# Patient Record
Sex: MALE | Race: BLACK OR AFRICAN AMERICAN | NOT HISPANIC OR LATINO | Employment: UNEMPLOYED | URBAN - METROPOLITAN AREA
[De-identification: names, ages, dates, MRNs, and addresses within clinical notes are randomized per-mention and may not be internally consistent; named-entity substitution may affect disease eponyms.]

---

## 2017-09-04 ENCOUNTER — HOSPITAL ENCOUNTER (EMERGENCY)
Facility: HOSPITAL | Age: 37
Discharge: HOME | End: 2017-09-04
Attending: EMERGENCY MEDICINE | Admitting: NEUROLOGICAL SURGERY
Payer: COMMERCIAL

## 2017-09-04 VITALS
SYSTOLIC BLOOD PRESSURE: 134 MMHG | WEIGHT: 240 LBS | HEIGHT: 72 IN | RESPIRATION RATE: 18 BRPM | DIASTOLIC BLOOD PRESSURE: 74 MMHG | OXYGEN SATURATION: 99 % | HEART RATE: 73 BPM | TEMPERATURE: 99 F | BODY MASS INDEX: 32.51 KG/M2
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
## 2017-09-04 DIAGNOSIS — S12.600A C7 CERVICAL FRACTURE: ICD-10-CM

## 2017-09-04 DIAGNOSIS — S12.601A CLOSED NONDISPLACED FRACTURE OF SEVENTH CERVICAL VERTEBRA, UNSPECIFIED FRACTURE MORPHOLOGY, INITIAL ENCOUNTER: Primary | ICD-10-CM

## 2017-09-04 LAB
ABO GROUP BLD: NORMAL
ALBUMIN SERPL BCP-MCNC: 4.2 G/DL
ALP SERPL-CCNC: 53 U/L
ALT SERPL W/O P-5'-P-CCNC: 22 U/L
AMPHET+METHAMPHET UR QL: NEGATIVE
AMPHET+METHAMPHET UR QL: NEGATIVE
ANION GAP SERPL CALC-SCNC: 14 MMOL/L
APAP SERPL-MCNC: <3 UG/ML
AST SERPL-CCNC: 24 U/L
BARBITURATES UR QL SCN>200 NG/ML: NEGATIVE
BARBITURATES UR QL SCN>200 NG/ML: NEGATIVE
BASOPHILS # BLD AUTO: 0.01 K/UL
BASOPHILS NFR BLD: 0.1 %
BENZODIAZ UR QL SCN>200 NG/ML: NEGATIVE
BENZODIAZ UR QL SCN>200 NG/ML: NEGATIVE
BILIRUB SERPL-MCNC: 0.3 MG/DL
BILIRUB UR QL STRIP: NEGATIVE
BLD GP AB SCN CELLS X3 SERPL QL: NORMAL
BUN SERPL-MCNC: 10 MG/DL
BZE UR QL SCN: NEGATIVE
BZE UR QL SCN: NEGATIVE
CALCIUM SERPL-MCNC: 9.2 MG/DL
CANNABINOIDS UR QL SCN: NEGATIVE
CANNABINOIDS UR QL SCN: NEGATIVE
CHLORIDE SERPL-SCNC: 108 MMOL/L
CLARITY UR REFRACT.AUTO: CLEAR
CO2 SERPL-SCNC: 21 MMOL/L
COLOR UR AUTO: NORMAL
CREAT SERPL-MCNC: 1.1 MG/DL
CREAT UR-MCNC: 87 MG/DL
CREAT UR-MCNC: 87 MG/DL
DIFFERENTIAL METHOD: ABNORMAL
EOSINOPHIL # BLD AUTO: 0 K/UL
EOSINOPHIL NFR BLD: 0.1 %
ERYTHROCYTE [DISTWIDTH] IN BLOOD BY AUTOMATED COUNT: 14.6 %
EST. GFR  (AFRICAN AMERICAN): >60 ML/MIN/1.73 M^2
EST. GFR  (NON AFRICAN AMERICAN): >60 ML/MIN/1.73 M^2
ETHANOL SERPL-MCNC: 173 MG/DL
ETHANOL UR-MCNC: 236 MG/DL
GLUCOSE SERPL-MCNC: 130 MG/DL
GLUCOSE UR QL STRIP: NEGATIVE
HCT VFR BLD AUTO: 46.6 %
HGB BLD-MCNC: 16.5 G/DL
HGB UR QL STRIP: NEGATIVE
INR PPP: 1
KETONES UR QL STRIP: NEGATIVE
LEUKOCYTE ESTERASE UR QL STRIP: NEGATIVE
LYMPHOCYTES # BLD AUTO: 1.9 K/UL
LYMPHOCYTES NFR BLD: 27.3 %
MCH RBC QN AUTO: 30.9 PG
MCHC RBC AUTO-ENTMCNC: 35.4 G/DL
MCV RBC AUTO: 87 FL
METHADONE UR QL SCN>300 NG/ML: NEGATIVE
METHADONE UR QL SCN>300 NG/ML: NEGATIVE
MONOCYTES # BLD AUTO: 0.4 K/UL
MONOCYTES NFR BLD: 5.3 %
NEUTROPHILS # BLD AUTO: 4.7 K/UL
NEUTROPHILS NFR BLD: 67.2 %
NITRITE UR QL STRIP: NEGATIVE
OPIATES UR QL SCN: NEGATIVE
OPIATES UR QL SCN: NEGATIVE
PCP UR QL SCN>25 NG/ML: NEGATIVE
PCP UR QL SCN>25 NG/ML: NEGATIVE
PH UR STRIP: 5 [PH] (ref 5–8)
PLATELET # BLD AUTO: 205 K/UL
PMV BLD AUTO: 10.7 FL
POTASSIUM SERPL-SCNC: 4.2 MMOL/L
PROT SERPL-MCNC: 8.7 G/DL
PROT UR QL STRIP: NEGATIVE
PROTHROMBIN TIME: 10.1 SEC
RBC # BLD AUTO: 5.34 M/UL
RH BLD: NORMAL
SALICYLATES SERPL-MCNC: <5 MG/DL
SODIUM SERPL-SCNC: 143 MMOL/L
SP GR UR STRIP: 1.01 (ref 1–1.03)
TOXICOLOGY INFORMATION: ABNORMAL
TOXICOLOGY INFORMATION: NORMAL
URN SPEC COLLECT METH UR: NORMAL
UROBILINOGEN UR STRIP-ACNC: NEGATIVE EU/DL
WBC # BLD AUTO: 7.04 K/UL

## 2017-09-04 PROCEDURE — 86901 BLOOD TYPING SEROLOGIC RH(D): CPT

## 2017-09-04 PROCEDURE — 96375 TX/PRO/DX INJ NEW DRUG ADDON: CPT

## 2017-09-04 PROCEDURE — 25000003 PHARM REV CODE 250: Performed by: EMERGENCY MEDICINE

## 2017-09-04 PROCEDURE — 99284 EMERGENCY DEPT VISIT MOD MDM: CPT | Mod: ,,, | Performed by: EMERGENCY MEDICINE

## 2017-09-04 PROCEDURE — 63600175 PHARM REV CODE 636 W HCPCS: Performed by: EMERGENCY MEDICINE

## 2017-09-04 PROCEDURE — 99285 EMERGENCY DEPT VISIT HI MDM: CPT | Mod: 25

## 2017-09-04 PROCEDURE — 96374 THER/PROPH/DIAG INJ IV PUSH: CPT

## 2017-09-04 PROCEDURE — 81003 URINALYSIS AUTO W/O SCOPE: CPT

## 2017-09-04 PROCEDURE — 80307 DRUG TEST PRSMV CHEM ANLYZR: CPT

## 2017-09-04 PROCEDURE — 86901 BLOOD TYPING SEROLOGIC RH(D): CPT | Mod: 91

## 2017-09-04 PROCEDURE — 99284 EMERGENCY DEPT VISIT MOD MDM: CPT | Mod: ,,, | Performed by: NEUROLOGICAL SURGERY

## 2017-09-04 PROCEDURE — 85025 COMPLETE CBC W/AUTO DIFF WBC: CPT

## 2017-09-04 PROCEDURE — 86900 BLOOD TYPING SEROLOGIC ABO: CPT

## 2017-09-04 PROCEDURE — 80320 DRUG SCREEN QUANTALCOHOLS: CPT

## 2017-09-04 PROCEDURE — 80053 COMPREHEN METABOLIC PANEL: CPT

## 2017-09-04 PROCEDURE — 63600175 PHARM REV CODE 636 W HCPCS

## 2017-09-04 PROCEDURE — 80329 ANALGESICS NON-OPIOID 1 OR 2: CPT

## 2017-09-04 PROCEDURE — 85610 PROTHROMBIN TIME: CPT

## 2017-09-04 PROCEDURE — 86900 BLOOD TYPING SEROLOGIC ABO: CPT | Mod: 91

## 2017-09-04 RX ORDER — METHOCARBAMOL 750 MG/1
1500 TABLET, FILM COATED ORAL 3 TIMES DAILY
Qty: 30 TABLET | Refills: 2 | Status: SHIPPED | OUTPATIENT
Start: 2017-09-04 | End: 2017-09-09

## 2017-09-04 RX ORDER — HYDROCODONE BITARTRATE AND ACETAMINOPHEN 7.5; 325 MG/1; MG/1
1 TABLET ORAL EVERY 6 HOURS PRN
Qty: 30 TABLET | Refills: 0 | Status: SHIPPED | OUTPATIENT
Start: 2017-09-04 | End: 2022-06-28

## 2017-09-04 RX ORDER — NALOXONE HCL 0.4 MG/ML
1 VIAL (ML) INJECTION
Status: DISCONTINUED | OUTPATIENT
Start: 2017-09-04 | End: 2017-09-04 | Stop reason: HOSPADM

## 2017-09-04 RX ORDER — LORAZEPAM 2 MG/ML
2 INJECTION INTRAMUSCULAR
Status: DISCONTINUED | OUTPATIENT
Start: 2017-09-04 | End: 2017-09-04 | Stop reason: HOSPADM

## 2017-09-04 RX ORDER — KETOROLAC TROMETHAMINE 30 MG/ML
30 INJECTION, SOLUTION INTRAMUSCULAR; INTRAVENOUS
Status: COMPLETED | OUTPATIENT
Start: 2017-09-04 | End: 2017-09-04

## 2017-09-04 RX ORDER — OXYCODONE AND ACETAMINOPHEN 10; 325 MG/1; MG/1
1 TABLET ORAL
Status: COMPLETED | OUTPATIENT
Start: 2017-09-04 | End: 2017-09-04

## 2017-09-04 RX ORDER — NALOXONE HYDROCHLORIDE 1 MG/ML
INJECTION INTRAMUSCULAR; INTRAVENOUS; SUBCUTANEOUS
Status: COMPLETED
Start: 2017-09-04 | End: 2017-09-04

## 2017-09-04 RX ADMIN — KETOROLAC TROMETHAMINE 30 MG: 30 INJECTION, SOLUTION INTRAMUSCULAR at 07:09

## 2017-09-04 RX ADMIN — OXYCODONE HYDROCHLORIDE AND ACETAMINOPHEN 1 TABLET: 10; 325 TABLET ORAL at 02:09

## 2017-09-04 RX ADMIN — NALOXONE HYDROCHLORIDE 1 MG: 1 INJECTION PARENTERAL at 07:09

## 2017-09-04 RX ADMIN — BACITRACIN, NEOMYCIN, POLYMYXIN B 1 EACH: 400; 3.5; 5 OINTMENT TOPICAL at 02:09

## 2017-09-04 RX ADMIN — LORAZEPAM 1 MG: 2 INJECTION INTRAMUSCULAR; INTRAVENOUS at 03:09

## 2017-09-04 NOTE — ED NOTES
Dr Baldev Clay made aware that patient appears sleepy and & lethargic. Will hold Lorazepam injection.
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
## 2017-09-04 NOTE — SUBJECTIVE & OBJECTIVE
(Not in a hospital admission)    Review of patient's allergies indicates:  No Known Allergies    History reviewed. No pertinent past medical history.  History reviewed. No pertinent surgical history.  Family History     None        Social History Main Topics    Smoking status: Current Every Day Smoker    Smokeless tobacco: Never Used    Alcohol use Yes    Drug use: No    Sexual activity: Not on file     Review of Systems   Constitutional: Negative for fever.   Respiratory: Negative for shortness of breath.    Cardiovascular: Negative for chest pain.   Gastrointestinal: Negative for abdominal pain.   Musculoskeletal: Positive for neck pain.   Neurological: Negative for weakness and numbness.   Psychiatric/Behavioral: Positive for agitation.     Objective:     Weight: 108.9 kg (240 lb)  Body mass index is 32.55 kg/m².  Vital Signs (Most Recent):  Temp: 98.6 °F (37 °C) (09/04/17 0438)  Pulse: 104 (09/04/17 0601)  Resp: 19 (09/04/17 0514)  BP: (!) 141/83 (09/04/17 0601)  SpO2: 95 % (09/04/17 0606) Vital Signs (24h Range):  Temp:  [98.6 °F (37 °C)] 98.6 °F (37 °C)  Pulse:  [] 104  Resp:  [18-20] 19  SpO2:  [95 %-98 %] 95 %  BP: (120-160)/() 141/83       Physical Exam:    Constitutional: He appears well-developed and well-nourished.     Eyes: Pupils are equal, round, and reactive to light. EOM are normal.     Abdominal: Soft.     Psych/Behavior: He is alert. He is oriented to person, place, and time.     Musculoskeletal:        Right Upper Extremities: Muscle strength is 5/5.        Left Upper Extremities: Muscle strength is 5/5.       Right Lower Extremities: Muscle strength is 5/5.        Left Lower Extremities: Muscle strength is 5/5.     Neurological:        Sensory: There is no sensory deficit in the trunk. There is no sensory deficit in the extremities.        DTRs: DTRs are DTRS NORMAL AND SYMMETRICnormal and symmetric.        Cranial nerves: Cranial nerve(s) II, III, IV, V, VI, VII, VIII, IX, X,  XI and XII are intact.            Significant Labs:    Recent Labs  Lab 09/04/17 0315   *      K 4.2      CO2 21*   BUN 10   CREATININE 1.1   CALCIUM 9.2       Recent Labs  Lab 09/04/17 0315   WBC 7.04   HGB 16.5   HCT 46.6          Recent Labs  Lab 09/04/17 0315   INR 1.0     Microbiology Results (last 7 days)     ** No results found for the last 168 hours. **        Significant Diagnostics:  CT: Ct Head Without Contrast    Result Date: 9/4/2017  No acute intracranial abnormality. Specifically, no intracranial hemorrhage. Electronically signed by: ALYSSA HERRING MD Date:     09/04/17 Time:    01:51
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
## 2017-09-04 NOTE — ED NOTES
Pt transported back to ER from MRI with nurse with telemetry. MRI cannot be done at this time because of CXR needed
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
## 2017-09-04 NOTE — ED NOTES
Pt continuously tries to turn on side in bed after being asked to lay straight. Will continue to monitor.

## 2017-09-04 NOTE — ED NOTES
CXR is complete. Per dr Baldev Clay pt ok to go to MRI. MRI called and states ok to bring pt back & will get their radiologist to clear pt
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
## 2017-09-04 NOTE — DISCHARGE INSTRUCTIONS
Keep the cervical collar on at all times  Take the muscle relaxants as prescribed  Take the pain medication as needed  Follow up with the Neurosurgery clinic; the clinic should call to make arrangements

## 2020-04-06 NOTE — SUBJECTIVE & OBJECTIVE
(Not in a hospital admission)    Review of patient's allergies indicates:  No Known Allergies    History reviewed. No pertinent past medical history.  History reviewed. No pertinent surgical history.  Family History     None        Social History Main Topics    Smoking status: Current Every Day Smoker    Smokeless tobacco: Never Used    Alcohol use Yes    Drug use: No    Sexual activity: Not on file     Review of Systems: 14-point review of systems completed and otherwise negative except as noted in HPI.     Objective:     Weight: 108.9 kg (240 lb)  Body mass index is 32.55 kg/m².  Vital Signs (Most Recent):  Temp: 98.6 °F (37 °C) (09/04/17 0438)  Pulse: 87 (09/04/17 1032)  Resp: 17 (09/04/17 1032)  BP: 125/76 (09/04/17 1032)  SpO2: 98 % (09/04/17 1032) Vital Signs (24h Range):  Temp:  [98.6 °F (37 °C)] 98.6 °F (37 °C)  Pulse:  [] 87  Resp:  [17-20] 17  SpO2:  [95 %-98 %] 98 %  BP: (120-160)/() 125/76              Neurosurgery Physical Exam  Vital signs: reviewed above.   Constitutional: well-developed, no apparent distress  Abdomen soft, non-tender  Respiratory: non distressed   Neurological  Alert, Oriented to person, place, time  Speech/Language: intact  Head: normocephalic, atraumatic   PERRL, EOMI,   Facial expression symmetric  Spontaneously moves all extremities  Follows commands x4     RUE: 5/5 in all major muscle groups    Left deltoid 3/5    Left bicep 3/5    Left tricep 3/5    Left hand intrinsics 5/5    BLE: 5/5  Tone: no spasticity, no rigidity, no clonus, no atrophy  Pronator Drift: neg   Babinski: absent   Sensation to Light touch: intact      Significant Labs:    Recent Labs  Lab 09/04/17 0315   *      K 4.2      CO2 21*   BUN 10   CREATININE 1.1   CALCIUM 9.2       Recent Labs  Lab 09/04/17 0315   WBC 7.04   HGB 16.5   HCT 46.6          Recent Labs  Lab 09/04/17 0315   INR 1.0     Microbiology Results (last 7 days)     ** No results found for the last  I don't mind speaking before or after the visit. If she prefers, we could try to have a relatively short video portion of the visit, and I could call her immediately after on a different number for the remainder of the scheduled visit time.   168 hours. **        All pertinent labs from the last 24 hours have been reviewed.    Significant Diagnostics:  CT: Ct Head Without Contrast    Result Date: 9/4/2017   Stable exam. No evidence of recent hemorrhage or other acute traumatic injury. ______________________________________ Electronically signed by resident: ENID RAMIREZ MD Date:     09/04/17 Time:    10:25 As the supervising and teaching physician, I personally reviewed the images and resident's interpretation and I agree with the findings. Electronically signed by: MILTON MORENO MD Date:     09/04/17 Time:    10:29     Ct Head Without Contrast    Result Date: 9/4/2017  No acute intracranial abnormality. Specifically, no intracranial hemorrhage. Electronically signed by: ALYSSA HERRING MD Date:     09/04/17 Time:    01:51

## 2022-06-28 ENCOUNTER — HOSPITAL ENCOUNTER (INPATIENT)
Facility: HOSPITAL | Age: 42
LOS: 2 days | Discharge: HOME | DRG: 682 | End: 2022-06-30
Attending: EMERGENCY MEDICINE | Admitting: STUDENT IN AN ORGANIZED HEALTH CARE EDUCATION/TRAINING PROGRAM

## 2022-06-28 DIAGNOSIS — R11.10 VOMITING: ICD-10-CM

## 2022-06-28 DIAGNOSIS — K92.2 ACUTE GI BLEEDING: Primary | ICD-10-CM

## 2022-06-28 DIAGNOSIS — K92.2 GASTROINTESTINAL HEMORRHAGE, UNSPECIFIED GASTROINTESTINAL HEMORRHAGE TYPE: ICD-10-CM

## 2022-06-28 DIAGNOSIS — F14.10 COCAINE ABUSE: ICD-10-CM

## 2022-06-28 DIAGNOSIS — R79.89 ELEVATED TROPONIN: ICD-10-CM

## 2022-06-28 DIAGNOSIS — N17.9 ACUTE RENAL FAILURE: ICD-10-CM

## 2022-06-28 PROBLEM — Z87.19 HISTORY OF HEMATEMESIS: Status: ACTIVE | Noted: 2022-06-28

## 2022-06-28 PROBLEM — E87.5 HYPERKALEMIA: Status: ACTIVE | Noted: 2022-06-28

## 2022-06-28 PROBLEM — R09.02 HYPOXIA: Status: ACTIVE | Noted: 2022-06-28

## 2022-06-28 PROBLEM — Z72.0 TOBACCO USE: Status: ACTIVE | Noted: 2022-06-28

## 2022-06-28 LAB
ABO + RH BLD: NORMAL
ALBUMIN SERPL BCP-MCNC: 4.4 G/DL (ref 3.5–5.2)
ALP SERPL-CCNC: 53 U/L (ref 55–135)
ALT SERPL W/O P-5'-P-CCNC: 49 U/L (ref 10–44)
AMPHET+METHAMPHET UR QL: NEGATIVE
ANION GAP SERPL CALC-SCNC: 12 MMOL/L (ref 8–16)
ANION GAP SERPL CALC-SCNC: 16 MMOL/L (ref 8–16)
APTT BLDCRRT: 26.1 SEC (ref 21–32)
AST SERPL-CCNC: 78 U/L (ref 10–40)
BACTERIA #/AREA URNS HPF: ABNORMAL /HPF
BARBITURATES UR QL SCN>200 NG/ML: NEGATIVE
BASOPHILS # BLD AUTO: 0.04 K/UL (ref 0–0.2)
BASOPHILS NFR BLD: 0.3 % (ref 0–1.9)
BENZODIAZ UR QL SCN>200 NG/ML: NEGATIVE
BILIRUB SERPL-MCNC: 0.4 MG/DL (ref 0.1–1)
BILIRUB UR QL STRIP: NEGATIVE
BLD GP AB SCN CELLS X3 SERPL QL: NORMAL
BNP SERPL-MCNC: 76 PG/ML (ref 0–99)
BUN SERPL-MCNC: 22 MG/DL (ref 6–20)
BUN SERPL-MCNC: 24 MG/DL (ref 6–20)
BZE UR QL SCN: NEGATIVE
CALCIUM SERPL-MCNC: 8.4 MG/DL (ref 8.7–10.5)
CALCIUM SERPL-MCNC: 9.1 MG/DL (ref 8.7–10.5)
CANNABINOIDS UR QL SCN: NEGATIVE
CHLORIDE SERPL-SCNC: 102 MMOL/L (ref 95–110)
CHLORIDE SERPL-SCNC: 105 MMOL/L (ref 95–110)
CLARITY UR: ABNORMAL
CO2 SERPL-SCNC: 20 MMOL/L (ref 23–29)
CO2 SERPL-SCNC: 23 MMOL/L (ref 23–29)
COLOR UR: YELLOW
CREAT SERPL-MCNC: 2.9 MG/DL (ref 0.5–1.4)
CREAT SERPL-MCNC: 3.9 MG/DL (ref 0.5–1.4)
CREAT UR-MCNC: 349.2 MG/DL (ref 23–375)
CREAT UR-MCNC: 349.2 MG/DL (ref 23–375)
CTP QC/QA: YES
CTP QC/QA: YES
DIFFERENTIAL METHOD: ABNORMAL
EOSINOPHIL # BLD AUTO: 0 K/UL (ref 0–0.5)
EOSINOPHIL NFR BLD: 0 % (ref 0–8)
ERYTHROCYTE [DISTWIDTH] IN BLOOD BY AUTOMATED COUNT: 15.5 % (ref 11.5–14.5)
EST. GFR  (AFRICAN AMERICAN): 21 ML/MIN/1.73 M^2
EST. GFR  (AFRICAN AMERICAN): 30 ML/MIN/1.73 M^2
EST. GFR  (NON AFRICAN AMERICAN): 18 ML/MIN/1.73 M^2
EST. GFR  (NON AFRICAN AMERICAN): 26 ML/MIN/1.73 M^2
ETHANOL SERPL-MCNC: <10 MG/DL
GLUCOSE SERPL-MCNC: 110 MG/DL (ref 70–110)
GLUCOSE SERPL-MCNC: 132 MG/DL (ref 70–110)
GLUCOSE UR QL STRIP: ABNORMAL
GRAN CASTS #/AREA URNS LPF: 8 /LPF
HCT VFR BLD AUTO: 51.1 % (ref 40–54)
HGB BLD-MCNC: 16.8 G/DL (ref 14–18)
HGB UR QL STRIP: ABNORMAL
HYALINE CASTS #/AREA URNS LPF: >10 /LPF
IMM GRANULOCYTES # BLD AUTO: 0.09 K/UL (ref 0–0.04)
IMM GRANULOCYTES NFR BLD AUTO: 0.6 % (ref 0–0.5)
INR PPP: 1 (ref 0.8–1.2)
KETONES UR QL STRIP: ABNORMAL
LACTATE SERPL-SCNC: 2.3 MMOL/L (ref 0.5–2.2)
LACTATE SERPL-SCNC: 3.2 MMOL/L (ref 0.5–2.2)
LEUKOCYTE ESTERASE UR QL STRIP: ABNORMAL
LIPASE SERPL-CCNC: 33 U/L (ref 4–60)
LYMPHOCYTES # BLD AUTO: 1.5 K/UL (ref 1–4.8)
LYMPHOCYTES NFR BLD: 10.2 % (ref 18–48)
MCH RBC QN AUTO: 30.3 PG (ref 27–31)
MCHC RBC AUTO-ENTMCNC: 32.9 G/DL (ref 32–36)
MCV RBC AUTO: 92 FL (ref 82–98)
METHADONE UR QL SCN>300 NG/ML: NEGATIVE
MICROSCOPIC COMMENT: ABNORMAL
MONOCYTES # BLD AUTO: 1.1 K/UL (ref 0.3–1)
MONOCYTES NFR BLD: 7.4 % (ref 4–15)
NEUTROPHILS # BLD AUTO: 12 K/UL (ref 1.8–7.7)
NEUTROPHILS NFR BLD: 81.5 % (ref 38–73)
NITRITE UR QL STRIP: NEGATIVE
NRBC BLD-RTO: 0 /100 WBC
OPIATES UR QL SCN: NEGATIVE
PCP UR QL SCN>25 NG/ML: NEGATIVE
PH UR STRIP: 6 [PH] (ref 5–8)
PLATELET # BLD AUTO: 221 K/UL (ref 150–450)
PMV BLD AUTO: 11.1 FL (ref 9.2–12.9)
POC MOLECULAR INFLUENZA A AGN: NEGATIVE
POC MOLECULAR INFLUENZA B AGN: NEGATIVE
POCT GLUCOSE: 145 MG/DL (ref 70–110)
POCT GLUCOSE: 147 MG/DL (ref 70–110)
POTASSIUM SERPL-SCNC: 5.8 MMOL/L (ref 3.5–5.1)
POTASSIUM SERPL-SCNC: 7.3 MMOL/L (ref 3.5–5.1)
PROT SERPL-MCNC: 9 G/DL (ref 6–8.4)
PROT UR QL STRIP: ABNORMAL
PROTHROMBIN TIME: 10.3 SEC (ref 9–12.5)
RBC # BLD AUTO: 5.54 M/UL (ref 4.6–6.2)
RBC #/AREA URNS HPF: 15 /HPF (ref 0–4)
SARS-COV-2 RDRP RESP QL NAA+PROBE: NEGATIVE
SODIUM SERPL-SCNC: 138 MMOL/L (ref 136–145)
SODIUM SERPL-SCNC: 140 MMOL/L (ref 136–145)
SODIUM UR-SCNC: 54 MMOL/L (ref 20–250)
SP GR UR STRIP: 1.02 (ref 1–1.03)
SQUAMOUS #/AREA URNS HPF: 2 /HPF
TOXICOLOGY INFORMATION: NORMAL
TROPONIN I SERPL DL<=0.01 NG/ML-MCNC: 0.52 NG/ML (ref 0–0.03)
TROPONIN I SERPL DL<=0.01 NG/ML-MCNC: 0.54 NG/ML (ref 0–0.03)
TROPONIN I SERPL DL<=0.01 NG/ML-MCNC: 0.61 NG/ML (ref 0–0.03)
URN SPEC COLLECT METH UR: ABNORMAL
UROBILINOGEN UR STRIP-ACNC: NEGATIVE EU/DL
WBC # BLD AUTO: 14.74 K/UL (ref 3.9–12.7)
WBC #/AREA URNS HPF: 17 /HPF (ref 0–5)
WBC CLUMPS URNS QL MICRO: ABNORMAL

## 2022-06-28 PROCEDURE — 93005 ELECTROCARDIOGRAM TRACING: CPT

## 2022-06-28 PROCEDURE — 82077 ASSAY SPEC XCP UR&BREATH IA: CPT

## 2022-06-28 PROCEDURE — 80053 COMPREHEN METABOLIC PANEL: CPT

## 2022-06-28 PROCEDURE — 93010 EKG 12-LEAD: ICD-10-PCS | Mod: ,,, | Performed by: INTERNAL MEDICINE

## 2022-06-28 PROCEDURE — 25000003 PHARM REV CODE 250: Performed by: EMERGENCY MEDICINE

## 2022-06-28 PROCEDURE — 85610 PROTHROMBIN TIME: CPT

## 2022-06-28 PROCEDURE — 85730 THROMBOPLASTIN TIME PARTIAL: CPT

## 2022-06-28 PROCEDURE — 25000003 PHARM REV CODE 250

## 2022-06-28 PROCEDURE — 99291 CRITICAL CARE FIRST HOUR: CPT | Mod: ,,, | Performed by: INTERNAL MEDICINE

## 2022-06-28 PROCEDURE — 63600175 PHARM REV CODE 636 W HCPCS: Performed by: EMERGENCY MEDICINE

## 2022-06-28 PROCEDURE — 83880 ASSAY OF NATRIURETIC PEPTIDE: CPT

## 2022-06-28 PROCEDURE — 20000000 HC ICU ROOM

## 2022-06-28 PROCEDURE — 85025 COMPLETE CBC W/AUTO DIFF WBC: CPT

## 2022-06-28 PROCEDURE — 80048 BASIC METABOLIC PNL TOTAL CA: CPT | Mod: XB | Performed by: STUDENT IN AN ORGANIZED HEALTH CARE EDUCATION/TRAINING PROGRAM

## 2022-06-28 PROCEDURE — 83605 ASSAY OF LACTIC ACID: CPT | Mod: 91

## 2022-06-28 PROCEDURE — 83690 ASSAY OF LIPASE: CPT

## 2022-06-28 PROCEDURE — 84300 ASSAY OF URINE SODIUM: CPT | Performed by: STUDENT IN AN ORGANIZED HEALTH CARE EDUCATION/TRAINING PROGRAM

## 2022-06-28 PROCEDURE — 94760 N-INVAS EAR/PLS OXIMETRY 1: CPT

## 2022-06-28 PROCEDURE — 36415 COLL VENOUS BLD VENIPUNCTURE: CPT | Performed by: STUDENT IN AN ORGANIZED HEALTH CARE EDUCATION/TRAINING PROGRAM

## 2022-06-28 PROCEDURE — 63600175 PHARM REV CODE 636 W HCPCS

## 2022-06-28 PROCEDURE — C9113 INJ PANTOPRAZOLE SODIUM, VIA: HCPCS | Performed by: STUDENT IN AN ORGANIZED HEALTH CARE EDUCATION/TRAINING PROGRAM

## 2022-06-28 PROCEDURE — U0002 COVID-19 LAB TEST NON-CDC: HCPCS

## 2022-06-28 PROCEDURE — 87502 INFLUENZA DNA AMP PROBE: CPT

## 2022-06-28 PROCEDURE — 99291 PR CRITICAL CARE, E/M 30-74 MINUTES: ICD-10-PCS | Mod: ,,, | Performed by: INTERNAL MEDICINE

## 2022-06-28 PROCEDURE — 25000242 PHARM REV CODE 250 ALT 637 W/ HCPCS

## 2022-06-28 PROCEDURE — 63600175 PHARM REV CODE 636 W HCPCS: Mod: JA | Performed by: EMERGENCY MEDICINE

## 2022-06-28 PROCEDURE — 63600175 PHARM REV CODE 636 W HCPCS: Performed by: STUDENT IN AN ORGANIZED HEALTH CARE EDUCATION/TRAINING PROGRAM

## 2022-06-28 PROCEDURE — 94640 AIRWAY INHALATION TREATMENT: CPT

## 2022-06-28 PROCEDURE — C9113 INJ PANTOPRAZOLE SODIUM, VIA: HCPCS | Performed by: EMERGENCY MEDICINE

## 2022-06-28 PROCEDURE — 81000 URINALYSIS NONAUTO W/SCOPE: CPT | Mod: 59

## 2022-06-28 PROCEDURE — 84484 ASSAY OF TROPONIN QUANT: CPT | Mod: 91 | Performed by: STUDENT IN AN ORGANIZED HEALTH CARE EDUCATION/TRAINING PROGRAM

## 2022-06-28 PROCEDURE — 93010 ELECTROCARDIOGRAM REPORT: CPT | Mod: ,,, | Performed by: INTERNAL MEDICINE

## 2022-06-28 PROCEDURE — 86900 BLOOD TYPING SEROLOGIC ABO: CPT

## 2022-06-28 PROCEDURE — 84484 ASSAY OF TROPONIN QUANT: CPT | Mod: 91

## 2022-06-28 PROCEDURE — 86901 BLOOD TYPING SEROLOGIC RH(D): CPT

## 2022-06-28 PROCEDURE — 96360 HYDRATION IV INFUSION INIT: CPT

## 2022-06-28 PROCEDURE — 99285 EMERGENCY DEPT VISIT HI MDM: CPT | Mod: 25

## 2022-06-28 PROCEDURE — 87086 URINE CULTURE/COLONY COUNT: CPT

## 2022-06-28 PROCEDURE — 80307 DRUG TEST PRSMV CHEM ANLYZR: CPT

## 2022-06-28 PROCEDURE — 82962 GLUCOSE BLOOD TEST: CPT

## 2022-06-28 RX ORDER — CALCIUM GLUCONATE 20 MG/ML
1 INJECTION, SOLUTION INTRAVENOUS
Status: COMPLETED | OUTPATIENT
Start: 2022-06-28 | End: 2022-06-28

## 2022-06-28 RX ORDER — SODIUM CHLORIDE 0.9 % (FLUSH) 0.9 %
10 SYRINGE (ML) INJECTION
Status: DISCONTINUED | OUTPATIENT
Start: 2022-06-28 | End: 2022-06-30 | Stop reason: HOSPADM

## 2022-06-28 RX ORDER — PANTOPRAZOLE SODIUM 40 MG/10ML
80 INJECTION, POWDER, LYOPHILIZED, FOR SOLUTION INTRAVENOUS
Status: COMPLETED | OUTPATIENT
Start: 2022-06-28 | End: 2022-06-28

## 2022-06-28 RX ORDER — FAMOTIDINE 10 MG/ML
20 INJECTION INTRAVENOUS DAILY
Status: DISCONTINUED | OUTPATIENT
Start: 2022-06-29 | End: 2022-06-28

## 2022-06-28 RX ORDER — IPRATROPIUM BROMIDE AND ALBUTEROL SULFATE 2.5; .5 MG/3ML; MG/3ML
3 SOLUTION RESPIRATORY (INHALATION)
Status: COMPLETED | OUTPATIENT
Start: 2022-06-28 | End: 2022-06-28

## 2022-06-28 RX ORDER — HEPARIN SODIUM,PORCINE/D5W 25000/250
0-40 INTRAVENOUS SOLUTION INTRAVENOUS CONTINUOUS
Status: DISCONTINUED | OUTPATIENT
Start: 2022-06-28 | End: 2022-06-28

## 2022-06-28 RX ORDER — PANTOPRAZOLE SODIUM 40 MG/10ML
40 INJECTION, POWDER, LYOPHILIZED, FOR SOLUTION INTRAVENOUS 2 TIMES DAILY
Status: DISCONTINUED | OUTPATIENT
Start: 2022-06-28 | End: 2022-06-30

## 2022-06-28 RX ORDER — OCTREOTIDE ACETATE 50 UG/ML
50 INJECTION, SOLUTION INTRAVENOUS; SUBCUTANEOUS
Status: COMPLETED | OUTPATIENT
Start: 2022-06-28 | End: 2022-06-28

## 2022-06-28 RX ORDER — ALBUTEROL SULFATE 2.5 MG/.5ML
10 SOLUTION RESPIRATORY (INHALATION)
Status: COMPLETED | OUTPATIENT
Start: 2022-06-28 | End: 2022-06-28

## 2022-06-28 RX ORDER — MAG HYDROX/ALUMINUM HYD/SIMETH 200-200-20
30 SUSPENSION, ORAL (FINAL DOSE FORM) ORAL ONCE
Status: COMPLETED | OUTPATIENT
Start: 2022-06-28 | End: 2022-06-28

## 2022-06-28 RX ORDER — LIDOCAINE HYDROCHLORIDE 20 MG/ML
10 SOLUTION OROPHARYNGEAL ONCE
Status: COMPLETED | OUTPATIENT
Start: 2022-06-28 | End: 2022-06-28

## 2022-06-28 RX ORDER — PANTOPRAZOLE SODIUM 40 MG/10ML
80 INJECTION, POWDER, LYOPHILIZED, FOR SOLUTION INTRAVENOUS DAILY
Status: DISCONTINUED | OUTPATIENT
Start: 2022-06-28 | End: 2022-06-28

## 2022-06-28 RX ORDER — ONDANSETRON 2 MG/ML
4 INJECTION INTRAMUSCULAR; INTRAVENOUS EVERY 8 HOURS PRN
Status: DISCONTINUED | OUTPATIENT
Start: 2022-06-28 | End: 2022-06-29

## 2022-06-28 RX ADMIN — IPRATROPIUM BROMIDE AND ALBUTEROL SULFATE 3 ML: 2.5; .5 SOLUTION RESPIRATORY (INHALATION) at 01:06

## 2022-06-28 RX ADMIN — ALUMINUM HYDROXIDE, MAGNESIUM HYDROXIDE, AND SIMETHICONE 30 ML: 200; 200; 20 SUSPENSION ORAL at 02:06

## 2022-06-28 RX ADMIN — LIDOCAINE HYDROCHLORIDE 10 ML: 20 SOLUTION ORAL; TOPICAL at 02:06

## 2022-06-28 RX ADMIN — OCTREOTIDE ACETATE 50 MCG: 50 INJECTION, SOLUTION INTRAVENOUS; SUBCUTANEOUS at 04:06

## 2022-06-28 RX ADMIN — SODIUM CHLORIDE 1000 ML: 0.9 INJECTION, SOLUTION INTRAVENOUS at 03:06

## 2022-06-28 RX ADMIN — DEXTROSE MONOHYDRATE 25 G: 25 INJECTION, SOLUTION INTRAVENOUS at 03:06

## 2022-06-28 RX ADMIN — ALBUTEROL SULFATE 10 MG: 2.5 SOLUTION RESPIRATORY (INHALATION) at 02:06

## 2022-06-28 RX ADMIN — PANTOPRAZOLE SODIUM 80 MG: 40 INJECTION, POWDER, FOR SOLUTION INTRAVENOUS at 03:06

## 2022-06-28 RX ADMIN — HEPARIN SODIUM 12 UNITS/KG/HR: 5000 INJECTION INTRAVENOUS; SUBCUTANEOUS at 03:06

## 2022-06-28 RX ADMIN — SODIUM ZIRCONIUM CYCLOSILICATE 10 G: 10 POWDER, FOR SUSPENSION ORAL at 02:06

## 2022-06-28 RX ADMIN — OCTREOTIDE ACETATE 50 MCG/HR: 500 INJECTION, SOLUTION INTRAVENOUS; SUBCUTANEOUS at 05:06

## 2022-06-28 RX ADMIN — PANTOPRAZOLE SODIUM 40 MG: 40 INJECTION, POWDER, FOR SOLUTION INTRAVENOUS at 09:06

## 2022-06-28 RX ADMIN — SODIUM CHLORIDE 1000 ML: 0.9 INJECTION, SOLUTION INTRAVENOUS at 01:06

## 2022-06-28 RX ADMIN — CALCIUM GLUCONATE 1 G: 20 INJECTION, SOLUTION INTRAVENOUS at 03:06

## 2022-06-28 RX ADMIN — INSULIN HUMAN 5 UNITS: 100 INJECTION, SOLUTION PARENTERAL at 03:06

## 2022-06-28 NOTE — H&P
[facility] Medicine  History & Physical    Patient Name: Feliberto Crowley  MRN: [id number]  Patient Class: IP- Inpatient  Admission Date: 6/28/2022  Attending Physician: Mazin Crespo MD   Primary Care Provider: Primary Doctor No         Patient information was obtained from patient, EMS personnel, past medical records and ER records.     Subjective:     Principal Problem:Acute renal failure    Chief Complaint:   Chief Complaint   Patient presents with    Hematemesis     Pt reports vomiting coffee ground emesis since this morning accompanied by dizziness and weakness. Pt denies medical history or medication use.         HPI: Mr. Crowley is a 41 year old male with no reported past medical history who presents to the emergency department for evaluation vomiting dark brown substance.  Patient states he drink a few beers last night and went to sleep.  He said he woke feeling nauseous and vomited everywhere.  He was also feeling weak so he came to the emergency department for further evaluation.  He tells me he snorts cocaine occasionally but is unsure if he did this yesterday.  He denies any other drug use.  He does smoke cigarettes. He denies taking any blood thinners. He is unsure of what exactly happened. 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 Has not been out in the heat. Denies any renal issues. Patient denies chest pain, states he had some heart burn earlier. Maybe some abdominal pain/discomfort but none now. No fevers or chills.   In the ED, he was found to be hypoxic and vomited brown substance. He was placed on nasal cannula with improvement. He was also found to have Cr of 3.9, K of 7.3, troponin of 0.544 and Hb of 16. Alcohol level was <10. He was given insulin, bicarb, lokelma, and dextrose as well as albuterol treatment. He was given 2 liters NS. Cardiology was consulted and recommended heparin drip.      No past medical history on  file.    No past surgical history on file.    Review of patient's allergies indicates:  No Known Allergies    No current facility-administered medications on file prior to encounter.     Current Outpatient Medications on File Prior to Encounter   Medication Sig    hydrocodone-acetaminophen 7.5-325mg (NORCO) 7.5-325 mg per tablet Take 1 tablet by mouth every 6 (six) hours as needed for Pain.     Family History    None       Tobacco Use    Smoking status: Current Every Day Smoker    Smokeless tobacco: Never Used   Substance and Sexual Activity    Alcohol use: Yes    Drug use: No    Sexual activity: Not on file     Review of Systems   Constitutional:  Negative for chills and fever.   HENT:  Negative for congestion, hearing loss and mouth sores.    Eyes:  Negative for visual disturbance.   Cardiovascular:  Negative for chest pain and leg swelling.   Gastrointestinal:  Positive for nausea and vomiting. Negative for abdominal distention, abdominal pain, constipation and diarrhea.   Genitourinary:  Negative for decreased urine volume, difficulty urinating, dysuria and hematuria.   Musculoskeletal:  Negative for arthralgias and joint swelling.   Skin:  Negative for rash and wound.   Neurological:  Negative for dizziness, numbness and headaches.   Psychiatric/Behavioral:  Positive for decreased concentration. Negative for agitation and confusion.    Objective:     Vital Signs (Most Recent):  Temp: 99.2 °F (37.3 °C) (06/28/22 1219)  Pulse: 103 (06/28/22 1532)  Resp: 18 (06/28/22 1438)  BP: 137/81 (06/28/22 1532)  SpO2: (!) 90 % (06/28/22 1532)   Vital Signs (24h Range):  Temp:  [99.2 °F (37.3 °C)] 99.2 °F (37.3 °C)  Pulse:  [] 103  Resp:  [18-20] 18  SpO2:  [86 %-97 %] 90 %  BP: (119-148)/(72-89) 137/81     Weight: 108.9 kg (240 lb)  Body mass index is 29.21 kg/m².    Physical Exam  Vitals and nursing note reviewed.   Constitutional:       General: He is not in acute distress.     Appearance: Normal appearance. He  is ill-appearing.   HENT:      Head: Normocephalic.      Mouth/Throat:      Pharynx: Oropharynx is clear.   Eyes:      General: No scleral icterus.  Cardiovascular:      Rate and Rhythm: Regular rhythm. Tachycardia present.      Pulses: Normal pulses.      Heart sounds: Normal heart sounds. No murmur heard.  Pulmonary:      Effort: Pulmonary effort is normal.      Breath sounds: No wheezing.      Comments: Poor air movement  Abdominal:      General: Bowel sounds are normal. There is no distension.      Palpations: Abdomen is soft.      Tenderness: There is no abdominal tenderness.   Musculoskeletal:         General: No swelling. Normal range of motion.   Skin:     General: Skin is warm and dry.   Neurological:      Mental Status: He is alert and oriented to person, place, and time.      Motor: No weakness.   Psychiatric:         Attention and Perception: Attention normal.         Mood and Affect: Mood is anxious.         Speech: Speech normal.         Behavior: Behavior normal. Behavior is cooperative.         Thought Content: Thought content normal.           Significant Labs: All pertinent labs within the past 24 hours have been reviewed.  CBC:   Recent Labs   Lab 06/28/22  1319   WBC 14.74*   HGB 16.8   HCT 51.1        CMP:   Recent Labs   Lab 06/28/22  1319      K 7.3*      CO2 20*      BUN 22*   CREATININE 3.9*   CALCIUM 9.1   PROT 9.0*   ALBUMIN 4.4   BILITOT 0.4   ALKPHOS 53*   AST 78*   ALT 49*   ANIONGAP 16   EGFRNONAA 18*     Lactic Acid:   Recent Labs   Lab 06/28/22  1319   LACTATE 2.3*       Significant Imaging: I have reviewed all pertinent imaging results/findings within the past 24 hours.    Assessment/Plan:     * Acute renal failure  - Patient presented with acute renal failure with Cr of 3.9 and hyperkalemia  - patient denies any hx of renal dysfunction, decreased urine output- he was given IVF and shifting medications  - check urine lytes and urinalysis  - repeat BMP after  "IVF            Hypoxia  - Noted to be hypoxic and placed on 10 liters - able to be weaned down   - in no distress  - CXR reviewed, poor inspiration  - continue to monitor      History of hematemesis  Patient reported dark colored vomit with specks of blood. Nurse reports "Coffee ground"  - Hb stable, blood pressure stable  - on PPI  - GI consulted and started on octreotide      Elevated troponin  - Denies chest pain, troponin elevated at 0.544  - ECG reviewed, no elizabeth ST elevation  - Cardiology consulted - Discussed with Dr. Juarez - no to heparin drip  - check echo  - trend troponin  - suspect elevated in setting of hypoxia/acute renal failure/demand      Tobacco use  - noted      Hyperkalemia  - presented with K of 7.3 in setting of acute renal failure  - shifted in ED + given lokelma  - recheck now - if persistently elevated, will ask Nephrology to see        VTE Risk Mitigation (From admission, onward)         Ordered     IP VTE LOW RISK PATIENT  Once         06/28/22 1510                   Mazin Crespo MD  Department of Hospital Medicine   [facility] [place] - Emergency Dept  "

## 2022-06-28 NOTE — ASSESSMENT & PLAN NOTE
"Patient reported dark colored vomit with specks of blood. Nurse reports "Coffee ground"  - Hb stable, blood pressure stable  - on PPI  - GI consulted and started on octreotide    "

## 2022-06-28 NOTE — ASSESSMENT & PLAN NOTE
- Noted to be hypoxic and placed on 10 liters - able to be weaned down   - in no distress  - CXR reviewed, poor inspiration  - continue to monitor

## 2022-06-28 NOTE — ASSESSMENT & PLAN NOTE
- presented with K of 7.3 in setting of acute renal failure  - shifted in ED + given lokelma  - recheck now - if persistently elevated, will ask Nephrology to see

## 2022-06-28 NOTE — Clinical Note
The catheter was inserted into the and was removed from the ostium   left main. An angiography was performed of the left coronary arteries. Multiple views were taken.

## 2022-06-28 NOTE — PHARMACY MED REC
"Admission Medication History     The home medication history was taken by Makenna Chowdhury CPhT.    You may go to "Admission" then "Reconcile Home Medications" tabs to review and/or act upon these items.      The home medication list has been updated by the Pharmacy department.    Please read ALL comments highlighted in yellow.    Please address this information as you see fit.     Feel free to contact us if you have any questions or require assistance.      The medications listed below were removed from the home medication list. Please reorder if appropriate:  Patient reports no longer taking the following medication(s):   NORCO 7.5-325 mg tablet      Medications listed below were obtained from: Patient/family and Analytic software- [employer]  (Not in a hospital admission)        Makenna Chowdhury CPhT.  [phone]                    .          "

## 2022-06-28 NOTE — Clinical Note
The catheter was inserted into the, was removed from the and was inserted over the wire into the ostium   right coronary artery. An angiography was performed of the right coronary arteries. Multiple views were taken.

## 2022-06-28 NOTE — ED NOTES
Patient put on 10 L O2 due to O2 in the 80s.   Patient's O2 on 10 L 93-94%.   MD and RN notified.

## 2022-06-28 NOTE — NURSING
[facility] Intensive Care  ICU Shift Summary  Date: 6/28/2022      Prehospitalization: Home  Admit Date / LOS : 6/28/2022/ 0 days    Diagnosis: Acute renal failure    Consults:        Active: Cardio and GI       Needed: N/A     Code Status: Full Code   Advanced Directive: <no information>    LDA:  Lines/Drains/Airways     Peripheral Intravenous Line  Duration                Peripheral IV - Single Lumen 06/28/22 1315 20 G Posterior;Right Hand <1 day         Peripheral IV - Single Lumen 06/28/22 1600 20 G Right Antecubital <1 day              Central Lines/Site/Justification:Patient Does Not Have Central Line  Urinary Cath/Order/Justification:Patient Does Not Have Urinary Catheter    Vasopressors/Infusions:    octreotide (SANDOSTATIN) infusion 50 mcg/hr (06/28/22 1706)          GOALS: Volume/ Hemodynamic: N/A                     RASS: 0  alert and calm    Pain Management: none       Pain Controlled: not applicable     Rhythm: NSR and ST    Respiratory Device: Nasal Cannula                  Most Recent SBT/ SAT: N/A       MOVE Screen: PASS  ICU Liberation: not applicable    VTE Prophylaxis: Pharm  Mobility: Ambulatory  Stress Ulcer Prophylaxis: No    Isolation: No active isolations    Dietary:   Current Diet Order   Procedures    Diet NPO      Tolerance: not applicable  Advancement: no    I & O (24h):    Intake/Output Summary (Last 24 hours) at 6/28/2022 1818  Last data filed at 6/28/2022 1700  Gross per 24 hour   Intake 2093.22 ml   Output --   Net 2093.22 ml        Restraints: No    Significant Dates:  Post Op Date: N/A  Rescue Date: N/A  Imaging/ Diagnostics: N/A    Noteworthy Labs:  See labs    COVID Test: (--)  CBC/Anemia Labs: Coags:    Recent Labs   Lab 06/28/22  1319   WBC 14.74*   HGB 16.8   HCT 51.1      MCV 92   RDW 15.5*    Recent Labs   Lab 06/28/22  1319   INR 1.0   APTT 26.1        Chemistries:   Recent Labs   Lab 06/28/22  1319      K 7.3*      CO2 20*   BUN 22*   CREATININE 3.9*    CALCIUM 9.1   PROT 9.0*   BILITOT 0.4   ALKPHOS 53*   ALT 49*   AST 78*        Cardiac Enzymes: Ejection Fractions:    Recent Labs     06/28/22  1319 06/28/22  1624   TROPONINI 0.544* 0.611*    No results found for: EF     POCT Glucose: HbA1c:    Recent Labs   Lab 06/28/22  1510 06/28/22  1611   POCTGLUCOSE 147* 145*    No results found for: HGBA1C        ICU LOS 1h  Level of Care: OK to Transfer    Chart Check: 12 HR Done  Shift Summary/Plan for the shift: see care plan note

## 2022-06-28 NOTE — Clinical Note
60 ml of contrast were injected throughout the case. 40 mL of contrast was the total wasted during the case. 100 mL was the total amount used during the case.

## 2022-06-28 NOTE — ASSESSMENT & PLAN NOTE
- Denies chest pain, troponin elevated at 0.544  - ECG reviewed, no elizabeth ST elevation  - Cardiology consulted - Discussed with Dr. Juarez - no to heparin drip  - check echo  - trend troponin  - suspect elevated in setting of hypoxia/acute renal failure/demand

## 2022-06-28 NOTE — ED TRIAGE NOTES
Pt. States that he vomited what looks like coffee ground emesis this morning. Pt's O2 sats are also low. Pt. States that he feels weak.

## 2022-06-28 NOTE — PLAN OF CARE
Pt AAO x4. ST on the monitor in the low 100s, other VSS on 2 L NC, afebrile. No episodes of n/v since admission. No acute events.

## 2022-06-28 NOTE — Clinical Note
dry, intact, no bleeding and no hematoma. Right groin, 5 Japanese sheath removed from rcfa, gauze, tegaderm

## 2022-06-28 NOTE — HPI
Mr. Crowley is a 41 year old male with no reported past medical history who presents to the emergency department for evaluation vomiting dark brown substance.  Patient states he drink a few beers last night and went to sleep.  He said he woke feeling nauseous and vomited everywhere.  He was also feeling weak so he came to the emergency department for further evaluation.  He tells me he snorts cocaine occasionally but is unsure if he did this yesterday.  He denies any other drug use.  He does smoke cigarettes. He denies taking any blood thinners. He is unsure of what exactly happened. 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 Has not been out in the heat. Denies any renal issues. Patient denies chest pain, states he had some heart burn earlier. Maybe some abdominal pain/discomfort but none now. No fevers or chills.   In the ED, he was found to be hypoxic and vomited brown substance. He was placed on nasal cannula with improvement. He was also found to have Cr of 3.9, K of 7.3, troponin of 0.544 and Hb of 16. Alcohol level was <10. He was given insulin, bicarb, lokelma, and dextrose as well as albuterol treatment. He was given 2 liters NS. Cardiology was consulted and recommended heparin drip.

## 2022-06-28 NOTE — ASSESSMENT & PLAN NOTE
- Patient presented with acute renal failure with Cr of 3.9 and hyperkalemia  - patient denies any hx of renal dysfunction, decreased urine output- he was given IVF and shifting medications  - check urine lytes and urinalysis  - repeat BMP after IVF

## 2022-06-28 NOTE — ED PROVIDER NOTES
"Encounter Date: 6/28/2022       History     Chief Complaint   Patient presents with    Hematemesis     Pt reports vomiting coffee ground emesis since this morning accompanied by dizziness and weakness. Pt denies medical history or medication use.      40 y/o male with no PMHx presents to the ED for emergent evaluation of acute onset hematemesis that happened this morning. Patient states that he woke up, coughed, and had an episode of projectile vomiting, which he describes as "brown with red blood." Patient denies another episode of emesis. He reports drinking alcohol, smoking 1 pack of cigarettes daily, and using cocaine. Patient reports drinking yesterday night; however, he's unsure how much he drank. Patient is also unsure if he did any cocaine yesterday. Patient denies abdominal pain, N/V/D, chest pain, SOB, dysuria, hematuria, rectal bleeding, blood in stool, fever, or chills. No other symptoms reported.    The history is provided by the patient. No  was used.     Review of patient's allergies indicates:  No Known Allergies  No past medical history on file.  No past surgical history on file.  No family history on file.  Social History     Tobacco Use    Smoking status: Current Every Day Smoker    Smokeless tobacco: Never Used   Substance Use Topics    Alcohol use: Yes    Drug use: No     Review of Systems   Constitutional: Negative for chills and fever.   HENT: Negative for congestion, ear pain, rhinorrhea and sore throat.    Eyes: Negative for redness.   Respiratory: Negative for cough and shortness of breath.    Cardiovascular: Negative for chest pain.   Gastrointestinal: Positive for vomiting. Negative for abdominal pain, diarrhea and nausea.   Genitourinary: Negative for decreased urine volume, difficulty urinating, dysuria, frequency, hematuria and urgency.   Musculoskeletal: Negative for back pain and neck pain.   Skin: Negative for rash.   Neurological: Negative for headaches. "   Psychiatric/Behavioral: Negative for confusion.       Physical Exam     Initial Vitals [06/28/22 1219]   BP Pulse Resp Temp SpO2   119/72 (!) 118 20 99.2 °F (37.3 °C) (!) 86 %      MAP       --         Physical Exam    Nursing note and vitals reviewed.  Constitutional: He appears well-developed and well-nourished. He appears lethargic. He is active. No distress.   HENT:   Head: Normocephalic and atraumatic.   Mouth/Throat: Mucous membranes are normal.   Eyes: EOM are normal.   Neck: Trachea normal. Neck supple.   Cardiovascular: Normal rate and regular rhythm.   Pulmonary/Chest: No respiratory distress. He has wheezes in the right upper field and the left upper field. He has rhonchi in the right upper field and the left upper field.   Abdominal: Abdomen is soft. Bowel sounds are normal. He exhibits no distension. There is no abdominal tenderness.   Musculoskeletal:         General: No edema. Normal range of motion.      Cervical back: Neck supple.     Neurological: He appears lethargic.   Skin: Skin is warm, dry and intact.   Psychiatric: He has a normal mood and affect.         ED Course   Procedures  Labs Reviewed   CBC W/ AUTO DIFFERENTIAL - Abnormal; Notable for the following components:       Result Value    WBC 14.74 (*)     RDW 15.5 (*)     Immature Granulocytes 0.6 (*)     Gran # (ANC) 12.0 (*)     Immature Grans (Abs) 0.09 (*)     Mono # 1.1 (*)     Gran % 81.5 (*)     Lymph % 10.2 (*)     All other components within normal limits   COMPREHENSIVE METABOLIC PANEL - Abnormal; Notable for the following components:    Potassium 7.3 (*)     CO2 20 (*)     BUN 22 (*)     Creatinine 3.9 (*)     Total Protein 9.0 (*)     Alkaline Phosphatase 53 (*)     AST 78 (*)     ALT 49 (*)     eGFR if  21 (*)     eGFR if non  18 (*)     All other components within normal limits    Narrative:       POTASSIUM critical result(s) called and verbal readback obtained   from LUCY MADERA by VARINDER  06/28/2022 14:12   TROPONIN I - Abnormal; Notable for the following components:    Troponin I 0.544 (*)     All other components within normal limits   LACTIC ACID, PLASMA - Abnormal; Notable for the following components:    Lactate (Lactic Acid) 2.3 (*)     All other components within normal limits   ALCOHOL,MEDICAL (ETHANOL)   PROTIME-INR   APTT   B-TYPE NATRIURETIC PEPTIDE   LIPASE   DRUG SCREEN PANEL, URINE EMERGENCY   URINALYSIS, REFLEX TO URINE CULTURE   LACTIC ACID, PLASMA   APTT   PROTIME-INR   CBC W/ AUTO DIFFERENTIAL   BASIC METABOLIC PANEL   POCT INFLUENZA A/B MOLECULAR   SARS-COV-2 RDRP GENE   TYPE & SCREEN   POCT GLUCOSE MONITORING CONTINUOUS          Imaging Results          X-Ray Chest AP Portable (Final result)  Result time 06/28/22 13:09:47    Final result by Car Tiwari MD (06/28/22 13:09:47)                 Impression:      Poor depth of inspiration.  No acute chest disease identified.      Electronically signed by: Car Tiwari MD  Date:    06/28/2022  Time:    13:09             Narrative:    EXAMINATION:  XR CHEST AP PORTABLE    CLINICAL HISTORY:  Vomiting, unspecified    TECHNIQUE:  Single frontal view of the chest was performed.    COMPARISON:  09/04/2017.    FINDINGS:  There is a poor depth of inspiration.  The heart and mediastinal structures are unremarkable.  Pulmonary vasculature is within normal limits.  The lungs are free of focal consolidations.  There is no evidence for pneumothorax or large pleural effusions.  Bony structures are grossly intact.                                 Medications   pantoprazole injection 80 mg (has no administration in time range)   calcium gluconate 1 g in NS IVPB (premixed) (has no administration in time range)   dextrose 50% injection 25 g (has no administration in time range)   insulin regular injection 5 Units 0.05 mL (has no administration in time range)   sodium chloride 0.9% bolus 1,000 mL (has no administration in time range)   heparin 25,000  "units in dextrose 5% (100 units/ml) IV bolus from bag INITIAL BOLUS (max bolus 4000 units) (has no administration in time range)   heparin 25,000 units in dextrose 5% 250 mL (100 units/mL) infusion LOW INTENSITY nomogram - [facility] (has no administration in time range)   heparin 25,000 units in dextrose 5% (100 units/ml) IV bolus from bag - ADDITIONAL PRN BOLUS - 60 units/kg (max bolus 4000 units) (has no administration in time range)   heparin 25,000 units in dextrose 5% (100 units/ml) IV bolus from bag - ADDITIONAL PRN BOLUS - 30 units/kg (max bolus 4000 units) (has no administration in time range)   sodium chloride 0.9% bolus 1,000 mL (0 mLs Intravenous Stopped 6/28/22 1429)   albuterol-ipratropium 2.5 mg-0.5 mg/3 mL nebulizer solution 3 mL (3 mLs Nebulization Given 6/28/22 1326)   aluminum-magnesium hydroxide-simethicone 200-200-20 mg/5 mL suspension 30 mL (30 mLs Oral Given 6/28/22 1453)     And   LIDOcaine HCl 2% oral solution 10 mL (10 mLs Oral Given 6/28/22 1453)   albuterol sulfate nebulizer solution 10 mg (10 mg Nebulization Given 6/28/22 1438)   sodium zirconium cyclosilicate packet 10 g (10 g Oral Given 6/28/22 1456)     Medical Decision Making:   ED Management:  This is a 40 y/o male with no PMHx presents to the ED for emergent evaluation of acute onset hematemesis that happened this morning. Patient states that he woke up, coughed, and had an episode of projectile vomiting, which he describes as "brown with red blood." On PE, patient appears lethargic. Upon triage, he had an oxygen saturation of 86% on RA. He has saturation of 93% O2 on 10L oxygen. He has expiratory wheezes and rhonchi to RUF and LUF. His abdomen is soft and nontender. CXR revealed poor depth of inspiration. No acute disease identified. No evidence of PTX or large pleural effusions. Lungs are free of focal consolidations. Breathing treatment ordered. Patient endorsed epigastric tenderness on reevaluation. Ordered protonix and GI cocktail. "     COVID and flu negative. CBC revealed leukocytosis of 14.74. CMP revealed K of 7.3, BUN of 22, and Cr of 3.9. Ordered treatment for hyperkalemia here. PTT and PT-INR are unremarkable. BNP and lipase unremarkable. Lactic acid 2.3. Ethanol negative. Trop elevated at 0.544. Dr. Goss consulted Dr. Juarez, who advised starting the patient on heparin. Dr. Goss also consulted Dr. Crespo, who will accept the patient and resume further care of the patient in the ICU for hyperkalemia and acute renal failure.     Case discussed with and care coordinated in conjunction with my attending, Dr. Goss.                      Clinical Impression:   Final diagnoses:  [R11.10] Vomiting  [R11.10] Vomiting  [N17.9] Acute renal failure          ED Disposition Condition    Admit               Roxana Borja PA-C  06/28/22 1510       Roxana Borja PA-C  06/28/22 1511

## 2022-06-28 NOTE — SUBJECTIVE & OBJECTIVE
No past medical history on file.    No past surgical history on file.    Review of patient's allergies indicates:  No Known Allergies    No current facility-administered medications on file prior to encounter.     Current Outpatient Medications on File Prior to Encounter   Medication Sig    hydrocodone-acetaminophen 7.5-325mg (NORCO) 7.5-325 mg per tablet Take 1 tablet by mouth every 6 (six) hours as needed for Pain.     Family History    None       Tobacco Use    Smoking status: Current Every Day Smoker    Smokeless tobacco: Never Used   Substance and Sexual Activity    Alcohol use: Yes    Drug use: No    Sexual activity: Not on file     Review of Systems   Constitutional:  Negative for chills and fever.   HENT:  Negative for congestion, hearing loss and mouth sores.    Eyes:  Negative for visual disturbance.   Cardiovascular:  Negative for chest pain and leg swelling.   Gastrointestinal:  Positive for nausea and vomiting. Negative for abdominal distention, abdominal pain, constipation and diarrhea.   Genitourinary:  Negative for decreased urine volume, difficulty urinating, dysuria and hematuria.   Musculoskeletal:  Negative for arthralgias and joint swelling.   Skin:  Negative for rash and wound.   Neurological:  Negative for dizziness, numbness and headaches.   Psychiatric/Behavioral:  Positive for decreased concentration. Negative for agitation and confusion.    Objective:     Vital Signs (Most Recent):  Temp: 99.2 °F (37.3 °C) (06/28/22 1219)  Pulse: 103 (06/28/22 1532)  Resp: 18 (06/28/22 1438)  BP: 137/81 (06/28/22 1532)  SpO2: (!) 90 % (06/28/22 1532)   Vital Signs (24h Range):  Temp:  [99.2 °F (37.3 °C)] 99.2 °F (37.3 °C)  Pulse:  [] 103  Resp:  [18-20] 18  SpO2:  [86 %-97 %] 90 %  BP: (119-148)/(72-89) 137/81     Weight: 108.9 kg (240 lb)  Body mass index is 29.21 kg/m².    Physical Exam  Vitals and nursing note reviewed.   Constitutional:       General: He is not in acute distress.     Appearance:  Normal appearance. He is ill-appearing.   HENT:      Head: Normocephalic.      Mouth/Throat:      Pharynx: Oropharynx is clear.   Eyes:      General: No scleral icterus.  Cardiovascular:      Rate and Rhythm: Regular rhythm. Tachycardia present.      Pulses: Normal pulses.      Heart sounds: Normal heart sounds. No murmur heard.  Pulmonary:      Effort: Pulmonary effort is normal.      Breath sounds: No wheezing.      Comments: Poor air movement  Abdominal:      General: Bowel sounds are normal. There is no distension.      Palpations: Abdomen is soft.      Tenderness: There is no abdominal tenderness.   Musculoskeletal:         General: No swelling. Normal range of motion.   Skin:     General: Skin is warm and dry.   Neurological:      Mental Status: He is alert and oriented to person, place, and time.      Motor: No weakness.   Psychiatric:         Attention and Perception: Attention normal.         Mood and Affect: Mood is anxious.         Speech: Speech normal.         Behavior: Behavior normal. Behavior is cooperative.         Thought Content: Thought content normal.           Significant Labs: All pertinent labs within the past 24 hours have been reviewed.  CBC:   Recent Labs   Lab 06/28/22  1319   WBC 14.74*   HGB 16.8   HCT 51.1        CMP:   Recent Labs   Lab 06/28/22  1319      K 7.3*      CO2 20*      BUN 22*   CREATININE 3.9*   CALCIUM 9.1   PROT 9.0*   ALBUMIN 4.4   BILITOT 0.4   ALKPHOS 53*   AST 78*   ALT 49*   ANIONGAP 16   EGFRNONAA 18*     Lactic Acid:   Recent Labs   Lab 06/28/22  1319   LACTATE 2.3*       Significant Imaging: I have reviewed all pertinent imaging results/findings within the past 24 hours.

## 2022-06-29 ENCOUNTER — ANESTHESIA (OUTPATIENT)
Dept: ENDOSCOPY | Facility: HOSPITAL | Age: 42
DRG: 682 | End: 2022-06-29

## 2022-06-29 ENCOUNTER — ANESTHESIA EVENT (OUTPATIENT)
Dept: ENDOSCOPY | Facility: HOSPITAL | Age: 42
DRG: 682 | End: 2022-06-29

## 2022-06-29 PROBLEM — K20.80 ESOPHAGITIS, LOS ANGELES GRADE C: Status: ACTIVE | Noted: 2022-06-29

## 2022-06-29 PROBLEM — R09.02 HYPOXIA: Status: RESOLVED | Noted: 2022-06-28 | Resolved: 2022-06-29

## 2022-06-29 PROBLEM — K92.2 GI HEMORRHAGE: Status: ACTIVE | Noted: 2022-06-28

## 2022-06-29 PROBLEM — D62 ACUTE BLOOD LOSS ANEMIA: Status: ACTIVE | Noted: 2022-06-29

## 2022-06-29 LAB
ALBUMIN SERPL BCP-MCNC: 3.5 G/DL (ref 3.5–5.2)
ALP SERPL-CCNC: 41 U/L (ref 55–135)
ALT SERPL W/O P-5'-P-CCNC: 65 U/L (ref 10–44)
ANION GAP SERPL CALC-SCNC: 10 MMOL/L (ref 8–16)
ASCENDING AORTA: 2.75 CM
AST SERPL-CCNC: 130 U/L (ref 10–40)
AV INDEX (PROSTH): 0.83
AV MEAN GRADIENT: 4 MMHG
AV PEAK GRADIENT: 7 MMHG
AV VALVE AREA: 2.75 CM2
AV VELOCITY RATIO: 0.81
BASOPHILS # BLD AUTO: 0.02 K/UL (ref 0–0.2)
BASOPHILS NFR BLD: 0.2 % (ref 0–1.9)
BILIRUB SERPL-MCNC: 0.5 MG/DL (ref 0.1–1)
BSA FOR ECHO PROCEDURE: 2.29 M2
BUN SERPL-MCNC: 22 MG/DL (ref 6–20)
CALCIUM SERPL-MCNC: 8.7 MG/DL (ref 8.7–10.5)
CHLORIDE SERPL-SCNC: 103 MMOL/L (ref 95–110)
CO2 SERPL-SCNC: 26 MMOL/L (ref 23–29)
CREAT SERPL-MCNC: 1.4 MG/DL (ref 0.5–1.4)
CV ECHO LV RWT: 0.42 CM
DIFFERENTIAL METHOD: ABNORMAL
DOP CALC AO PEAK VEL: 1.35 M/S
DOP CALC AO VTI: 28.35 CM
DOP CALC LVOT AREA: 3.3 CM2
DOP CALC LVOT DIAMETER: 2.06 CM
DOP CALC LVOT PEAK VEL: 1.09 M/S
DOP CALC LVOT STROKE VOLUME: 77.95 CM3
DOP CALCLVOT PEAK VEL VTI: 23.4 CM
E WAVE DECELERATION TIME: 109.77 MSEC
E/A RATIO: 1.59
E/E' RATIO: 9.37 M/S
ECHO LV POSTERIOR WALL: 1.03 CM (ref 0.6–1.1)
EJECTION FRACTION: 55 %
EOSINOPHIL # BLD AUTO: 0 K/UL (ref 0–0.5)
EOSINOPHIL NFR BLD: 0 % (ref 0–8)
ERYTHROCYTE [DISTWIDTH] IN BLOOD BY AUTOMATED COUNT: 15.2 % (ref 11.5–14.5)
EST. GFR  (AFRICAN AMERICAN): >60 ML/MIN/1.73 M^2
EST. GFR  (NON AFRICAN AMERICAN): >60 ML/MIN/1.73 M^2
FRACTIONAL SHORTENING: 28 % (ref 28–44)
GLUCOSE SERPL-MCNC: 129 MG/DL (ref 70–110)
HCT VFR BLD AUTO: 41.3 % (ref 40–54)
HGB BLD-MCNC: 13.7 G/DL (ref 14–18)
IMM GRANULOCYTES # BLD AUTO: 0.03 K/UL (ref 0–0.04)
IMM GRANULOCYTES NFR BLD AUTO: 0.3 % (ref 0–0.5)
INTERVENTRICULAR SEPTUM: 1 CM (ref 0.6–1.1)
IVRT: 122.26 MSEC
LA MAJOR: 4.79 CM
LA MINOR: 4.66 CM
LA WIDTH: 4.37 CM
LEFT ATRIUM SIZE: 2.93 CM
LEFT ATRIUM VOLUME INDEX: 22.7 ML/M2
LEFT ATRIUM VOLUME: 51.41 CM3
LEFT INTERNAL DIMENSION IN SYSTOLE: 3.57 CM (ref 2.1–4)
LEFT VENTRICLE DIASTOLIC VOLUME INDEX: 51.3 ML/M2
LEFT VENTRICLE DIASTOLIC VOLUME: 115.94 ML
LEFT VENTRICLE MASS INDEX: 81 G/M2
LEFT VENTRICLE SYSTOLIC VOLUME INDEX: 23.6 ML/M2
LEFT VENTRICLE SYSTOLIC VOLUME: 53.24 ML
LEFT VENTRICULAR INTERNAL DIMENSION IN DIASTOLE: 4.96 CM (ref 3.5–6)
LEFT VENTRICULAR MASS: 183.23 G
LV LATERAL E/E' RATIO: 8.09 M/S
LV SEPTAL E/E' RATIO: 11.13 M/S
LYMPHOCYTES # BLD AUTO: 2.8 K/UL (ref 1–4.8)
LYMPHOCYTES NFR BLD: 23.6 % (ref 18–48)
MAGNESIUM SERPL-MCNC: 2.1 MG/DL (ref 1.6–2.6)
MCH RBC QN AUTO: 30.4 PG (ref 27–31)
MCHC RBC AUTO-ENTMCNC: 33.2 G/DL (ref 32–36)
MCV RBC AUTO: 92 FL (ref 82–98)
MONOCYTES # BLD AUTO: 1.2 K/UL (ref 0.3–1)
MONOCYTES NFR BLD: 10.3 % (ref 4–15)
MV PEAK A VEL: 0.56 M/S
MV PEAK E VEL: 0.89 M/S
MV STENOSIS PRESSURE HALF TIME: 31.83 MS
MV VALVE AREA P 1/2 METHOD: 6.91 CM2
NEUTROPHILS # BLD AUTO: 7.7 K/UL (ref 1.8–7.7)
NEUTROPHILS NFR BLD: 65.6 % (ref 38–73)
NRBC BLD-RTO: 0 /100 WBC
PHOSPHATE SERPL-MCNC: 3.6 MG/DL (ref 2.7–4.5)
PISA TR MAX VEL: 2.13 M/S
PLATELET # BLD AUTO: 156 K/UL (ref 150–450)
PMV BLD AUTO: 10.2 FL (ref 9.2–12.9)
POTASSIUM SERPL-SCNC: 4.5 MMOL/L (ref 3.5–5.1)
PROT SERPL-MCNC: 6.9 G/DL (ref 6–8.4)
PULM VEIN S/D RATIO: 1
PV PEAK D VEL: 0.43 M/S
PV PEAK S VEL: 0.43 M/S
PV PEAK VELOCITY: 0.93 CM/S
RA MAJOR: 4.65 CM
RA PRESSURE: 3 MMHG
RA WIDTH: 3.25 CM
RBC # BLD AUTO: 4.51 M/UL (ref 4.6–6.2)
RIGHT VENTRICULAR END-DIASTOLIC DIMENSION: 3.83 CM
RV TISSUE DOPPLER FREE WALL SYSTOLIC VELOCITY 1 (APICAL 4 CHAMBER VIEW): 12.01 CM/S
SINUS: 2.82 CM
SODIUM SERPL-SCNC: 139 MMOL/L (ref 136–145)
STJ: 2.55 CM
TDI LATERAL: 0.11 M/S
TDI SEPTAL: 0.08 M/S
TDI: 0.1 M/S
TR MAX PG: 18 MMHG
TRICUSPID ANNULAR PLANE SYSTOLIC EXCURSION: 1.84 CM
TROPONIN I SERPL DL<=0.01 NG/ML-MCNC: 0.48 NG/ML (ref 0–0.03)
TV REST PULMONARY ARTERY PRESSURE: 21 MMHG
WBC # BLD AUTO: 11.73 K/UL (ref 3.9–12.7)

## 2022-06-29 PROCEDURE — 80053 COMPREHEN METABOLIC PANEL: CPT | Performed by: STUDENT IN AN ORGANIZED HEALTH CARE EDUCATION/TRAINING PROGRAM

## 2022-06-29 PROCEDURE — D9220A PRA ANESTHESIA: Mod: ,,, | Performed by: ANESTHESIOLOGY

## 2022-06-29 PROCEDURE — 25000003 PHARM REV CODE 250: Performed by: EMERGENCY MEDICINE

## 2022-06-29 PROCEDURE — 99222 1ST HOSP IP/OBS MODERATE 55: CPT | Mod: 25,,, | Performed by: INTERNAL MEDICINE

## 2022-06-29 PROCEDURE — C9113 INJ PANTOPRAZOLE SODIUM, VIA: HCPCS | Performed by: STUDENT IN AN ORGANIZED HEALTH CARE EDUCATION/TRAINING PROGRAM

## 2022-06-29 PROCEDURE — 36415 COLL VENOUS BLD VENIPUNCTURE: CPT | Performed by: STUDENT IN AN ORGANIZED HEALTH CARE EDUCATION/TRAINING PROGRAM

## 2022-06-29 PROCEDURE — 43235 EGD DIAGNOSTIC BRUSH WASH: CPT | Mod: ,,, | Performed by: INTERNAL MEDICINE

## 2022-06-29 PROCEDURE — 63600175 PHARM REV CODE 636 W HCPCS: Performed by: NURSE ANESTHETIST, CERTIFIED REGISTERED

## 2022-06-29 PROCEDURE — 43235 PR EGD, FLEX, DIAGNOSTIC: ICD-10-PCS | Mod: ,,, | Performed by: INTERNAL MEDICINE

## 2022-06-29 PROCEDURE — 83735 ASSAY OF MAGNESIUM: CPT | Performed by: STUDENT IN AN ORGANIZED HEALTH CARE EDUCATION/TRAINING PROGRAM

## 2022-06-29 PROCEDURE — D9220A PRA ANESTHESIA: ICD-10-PCS | Mod: ,,, | Performed by: ANESTHESIOLOGY

## 2022-06-29 PROCEDURE — 85025 COMPLETE CBC W/AUTO DIFF WBC: CPT | Performed by: STUDENT IN AN ORGANIZED HEALTH CARE EDUCATION/TRAINING PROGRAM

## 2022-06-29 PROCEDURE — 25000003 PHARM REV CODE 250: Performed by: HOSPITALIST

## 2022-06-29 PROCEDURE — 37000008 HC ANESTHESIA 1ST 15 MINUTES: Performed by: INTERNAL MEDICINE

## 2022-06-29 PROCEDURE — 43235 EGD DIAGNOSTIC BRUSH WASH: CPT | Performed by: INTERNAL MEDICINE

## 2022-06-29 PROCEDURE — S5010 5% DEXTROSE AND 0.45% SALINE: HCPCS | Performed by: HOSPITALIST

## 2022-06-29 PROCEDURE — 25000003 PHARM REV CODE 250: Performed by: NURSE ANESTHETIST, CERTIFIED REGISTERED

## 2022-06-29 PROCEDURE — 20000000 HC ICU ROOM

## 2022-06-29 PROCEDURE — 99291 PR CRITICAL CARE, E/M 30-74 MINUTES: ICD-10-PCS | Mod: 25,,, | Performed by: INTERNAL MEDICINE

## 2022-06-29 PROCEDURE — 99222 PR INITIAL HOSPITAL CARE,LEVL II: ICD-10-PCS | Mod: 25,,, | Performed by: INTERNAL MEDICINE

## 2022-06-29 PROCEDURE — 37000009 HC ANESTHESIA EA ADD 15 MINS: Performed by: INTERNAL MEDICINE

## 2022-06-29 PROCEDURE — 63600175 PHARM REV CODE 636 W HCPCS: Mod: JA | Performed by: EMERGENCY MEDICINE

## 2022-06-29 PROCEDURE — 63600175 PHARM REV CODE 636 W HCPCS: Performed by: STUDENT IN AN ORGANIZED HEALTH CARE EDUCATION/TRAINING PROGRAM

## 2022-06-29 PROCEDURE — 84100 ASSAY OF PHOSPHORUS: CPT | Performed by: STUDENT IN AN ORGANIZED HEALTH CARE EDUCATION/TRAINING PROGRAM

## 2022-06-29 PROCEDURE — 99291 CRITICAL CARE FIRST HOUR: CPT | Mod: 25,,, | Performed by: INTERNAL MEDICINE

## 2022-06-29 RX ORDER — ONDANSETRON 2 MG/ML
8 INJECTION INTRAMUSCULAR; INTRAVENOUS EVERY 6 HOURS PRN
Status: DISCONTINUED | OUTPATIENT
Start: 2022-06-29 | End: 2022-06-30

## 2022-06-29 RX ORDER — DEXTROSE MONOHYDRATE AND SODIUM CHLORIDE 5; .45 G/100ML; G/100ML
INJECTION, SOLUTION INTRAVENOUS CONTINUOUS
Status: DISCONTINUED | OUTPATIENT
Start: 2022-06-29 | End: 2022-06-29

## 2022-06-29 RX ORDER — PROPOFOL 10 MG/ML
VIAL (ML) INTRAVENOUS
Status: DISCONTINUED | OUTPATIENT
Start: 2022-06-29 | End: 2022-06-29

## 2022-06-29 RX ORDER — LIDOCAINE HYDROCHLORIDE 20 MG/ML
INJECTION INTRAVENOUS
Status: DISCONTINUED | OUTPATIENT
Start: 2022-06-29 | End: 2022-06-29

## 2022-06-29 RX ORDER — LIDOCAINE HYDROCHLORIDE 20 MG/ML
INJECTION, SOLUTION EPIDURAL; INFILTRATION; INTRACAUDAL; PERINEURAL
Status: DISPENSED
Start: 2022-06-29 | End: 2022-06-30

## 2022-06-29 RX ORDER — ACETAMINOPHEN 325 MG/1
650 TABLET ORAL EVERY 4 HOURS PRN
Status: DISCONTINUED | OUTPATIENT
Start: 2022-06-29 | End: 2022-06-30 | Stop reason: HOSPADM

## 2022-06-29 RX ORDER — POLYETHYLENE GLYCOL 3350 17 G/17G
17 POWDER, FOR SOLUTION ORAL 2 TIMES DAILY PRN
Status: DISCONTINUED | OUTPATIENT
Start: 2022-06-29 | End: 2022-06-30 | Stop reason: HOSPADM

## 2022-06-29 RX ORDER — PROPOFOL 10 MG/ML
INJECTION, EMULSION INTRAVENOUS
Status: DISPENSED
Start: 2022-06-29 | End: 2022-06-30

## 2022-06-29 RX ADMIN — LIDOCAINE HYDROCHLORIDE 100 MG: 20 INJECTION, SOLUTION INTRAVENOUS at 12:06

## 2022-06-29 RX ADMIN — PANTOPRAZOLE SODIUM 40 MG: 40 INJECTION, POWDER, FOR SOLUTION INTRAVENOUS at 09:06

## 2022-06-29 RX ADMIN — PANTOPRAZOLE SODIUM 40 MG: 40 INJECTION, POWDER, FOR SOLUTION INTRAVENOUS at 08:06

## 2022-06-29 RX ADMIN — PROPOFOL 60 MG: 10 INJECTION, EMULSION INTRAVENOUS at 12:06

## 2022-06-29 RX ADMIN — OCTREOTIDE ACETATE 50 MCG/HR: 500 INJECTION, SOLUTION INTRAVENOUS; SUBCUTANEOUS at 01:06

## 2022-06-29 RX ADMIN — SODIUM CHLORIDE: 0.9 INJECTION, SOLUTION INTRAVENOUS at 12:06

## 2022-06-29 RX ADMIN — PROPOFOL 100 MG: 10 INJECTION, EMULSION INTRAVENOUS at 12:06

## 2022-06-29 RX ADMIN — DEXTROSE AND SODIUM CHLORIDE: 5; .45 INJECTION, SOLUTION INTRAVENOUS at 11:06

## 2022-06-29 RX ADMIN — OCTREOTIDE ACETATE 50 MCG/HR: 500 INJECTION, SOLUTION INTRAVENOUS; SUBCUTANEOUS at 11:06

## 2022-06-29 NOTE — HPI
Mr. Crowley is a 41 year old male with no reported past medical history who presents to the emergency department for evaluation vomiting dark brown substance.  Patient states he drink a few beers last night and went to sleep.  He said he woke feeling nauseous and vomited everywhere.  He was also feeling weak so he came to the emergency department for further evaluation.  He tells me he snorts cocaine occasionally but is unsure if he did this yesterday.  He denies any other drug use.  He does smoke cigarettes. He denies taking any blood thinners. He is unsure of what exactly happened. 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 Has not been out in the heat. Denies any renal issues. Patient denies chest pain, states he had some heart burn earlier. Maybe some abdominal pain/discomfort but none now. No fevers or chills.   In the ED, he was found to be hypoxic and vomited brown substance. He was placed on nasal cannula with improvement. He was also found to have Cr of 3.9, K of 7.3, troponin of 0.544 and Hb of 16. Alcohol level was <10. He was given insulin, bicarb, lokelma, and dextrose as well as albuterol treatment. He was given 2 liters NS.     Patient denies any anginal sounding chest pains.  Troponins mildly elevated.  Had an episode of hematemesis in the ER also and 1 episode at home.  States that he snorts cocaine about once a month.  EKG without evidence of ST elevation.

## 2022-06-29 NOTE — TRANSFER OF CARE
"Anesthesia Transfer of Care Note    Patient: Feliberto Crowley    Procedure(s) Performed: Procedure(s) (LRB):  EGD (ESOPHAGOGASTRODUODENOSCOPY) (N/A)    Patient location: ICU    Anesthesia Type: general    Transport from OR: Transported from OR on 2-3 L/min O2 by NC with adequate spontaneous ventilation. Continuous ECG monitoring in transport. Continuous SpO2 monitoring in transport    Post pain: adequate analgesia    Post assessment: no apparent anesthetic complications and tolerated procedure well    Post vital signs: stable    Level of consciousness: awake, alert and oriented    Nausea/Vomiting: no nausea/vomiting    Complications: none    Transfer of care protocol was followed      Last vitals:   Visit Vitals  /84 (BP Location: Left arm, Patient Position: Lying)   Pulse 91   Temp 36 °C (96.8 °F) (Skin)   Resp 14   Ht 6' 1" (1.854 m)   Wt 102.2 kg (225 lb 5 oz)   SpO2 95%   BMI 29.73 kg/m²     "

## 2022-06-29 NOTE — PROGRESS NOTES
[facility] [place] [facility]ensive [facility]  Cardiology  Progress Note    Patient Name: Feliberto Crowley  MRN: [id number]  Admission Date: 6/28/2022  Hospital Length of Stay: 1 days  Code Status: Full Code   Attending Physician: Pepe Devries MD   Primary Care Physician: Primary Doctor No  Expected Discharge Date:   Principal Problem:Acute renal failure    Subjective:       Interval History:  No further episodes of chest pain.  GI workup today.    Review of Systems   Constitutional: Negative.   HENT: Negative.     Eyes: Negative.    Cardiovascular: Negative.    Respiratory: Negative.     Endocrine: Negative.    Hematologic/Lymphatic: Negative.    Skin: Negative.    Musculoskeletal: Negative.    Genitourinary: Negative.    Neurological: Negative.    Psychiatric/Behavioral: Negative.     Allergic/Immunologic: Negative.    Objective:     Vital Signs (Most Recent):  Temp: 97.8 °F (36.6 °C) (06/29/22 1501)  Pulse: 83 (06/29/22 1700)  Resp: 13 (06/29/22 1700)  BP: (!) 158/79 (06/29/22 1700)  SpO2: 95 % (06/29/22 1700)   Vital Signs (24h Range):  Temp:  [96.8 °F (36 °C)-98.2 °F (36.8 °C)] 97.8 °F (36.6 °C)  Pulse:  [] 83  Resp:  [11-39] 13  SpO2:  [90 %-100 %] 95 %  BP: (108-158)/(73-94) 158/79     Weight: 102.2 kg (225 lb 5 oz)  Body mass index is 29.73 kg/m².     SpO2: 95 %  O2 Device (Oxygen Therapy): nasal cannula      Intake/Output Summary (Last 24 hours) at 6/29/2022 1759  Last data filed at 6/29/2022 1600  Gross per 24 hour   Intake 381.54 ml   Output 2250 ml   Net -1868.46 ml       Lines/Drains/Airways       Peripheral Intravenous Line  Duration                  Peripheral IV - Single Lumen 06/28/22 1315 20 G Left Antecubital 1 day         Peripheral IV - Single Lumen 06/28/22 1600 20 G Right Antecubital 1 day                    Physical Exam  Vitals reviewed.   Constitutional:       Appearance: He is well-developed.   HENT:      Head: Normocephalic.   Eyes:      Conjunctiva/sclera: Conjunctivae normal.      Pupils: Pupils  are equal, round, and reactive to light.   Cardiovascular:      Rate and Rhythm: Normal rate and regular rhythm.      Heart sounds: Normal heart sounds.   Pulmonary:      Effort: Pulmonary effort is normal.      Breath sounds: Normal breath sounds.   Abdominal:      General: Bowel sounds are normal.      Palpations: Abdomen is soft.   Musculoskeletal:      Cervical back: Normal range of motion and neck supple.   Skin:     General: Skin is warm.   Neurological:      Mental Status: He is alert and oriented to person, place, and time.       Significant Labs: BMP:   Recent Labs   Lab 06/28/22  1319 06/28/22  1816 06/29/22  0606    132* 129*    140 139   K 7.3* 5.8* 4.5    105 103   CO2 20* 23 26   BUN 22* 24* 22*   CREATININE 3.9* 2.9* 1.4   CALCIUM 9.1 8.4* 8.7   MG  --   --  2.1   , CMP   Recent Labs   Lab 06/28/22  1319 06/28/22  1816 06/29/22  0606    140 139   K 7.3* 5.8* 4.5    105 103   CO2 20* 23 26    132* 129*   BUN 22* 24* 22*   CREATININE 3.9* 2.9* 1.4   CALCIUM 9.1 8.4* 8.7   PROT 9.0*  --  6.9   ALBUMIN 4.4  --  3.5   BILITOT 0.4  --  0.5   ALKPHOS 53*  --  41*   AST 78*  --  130*   ALT 49*  --  65*   ANIONGAP 16 12 10   ESTGFRAFRICA 21* 30* >60   EGFRNONAA 18* 26* >60   , CBC   Recent Labs   Lab 06/28/22  1319 06/29/22  0636   WBC 14.74* 11.73   HGB 16.8 13.7*   HCT 51.1 41.3    156   , INR   Recent Labs   Lab 06/28/22  1319   INR 1.0   , Lipid Panel No results for input(s): CHOL, HDL, LDLCALC, TRIG, CHOLHDL in the last 48 hours., Troponin   Recent Labs   Lab 06/28/22  1624 06/28/22  2030 06/28/22  2344   TROPONINI 0.611* 0.518* 0.481*   , and All pertinent lab results from the last 24 hours have been reviewed.    Significant Imaging: Echocardiogram: Transthoracic echo (TTE) complete (Cupid Only):   Results for orders placed or performed during the hospital encounter of 06/28/22   Echo   Result Value Ref Range    BSA 2.29 m2    TDI SEPTAL 0.08 m/s    LV LATERAL  E/E' RATIO 8.09 m/s    LV SEPTAL E/E' RATIO 11.13 m/s    LA WIDTH 4.37 cm    TDI LATERAL 0.11 m/s    PV PEAK VELOCITY 0.93 cm/s    LVIDd 4.96 3.5 - 6.0 cm    IVS 1.00 0.6 - 1.1 cm    Posterior Wall 1.03 0.6 - 1.1 cm    LVIDs 3.57 2.1 - 4.0 cm    FS 28 28 - 44 %    LA volume 51.41 cm3    Sinus 2.82 cm    STJ 2.55 cm    Ascending aorta 2.75 cm    LV mass 183.23 g    LA size 2.93 cm    RVDD 3.83 cm    TAPSE 1.84 cm    RV S' 12.01 cm/s    Left Ventricle Relative Wall Thickness 0.42 cm    AV mean gradient 4 mmHg    AV valve area 2.75 cm2    AV Velocity Ratio 0.81     AV index (prosthetic) 0.83     MV valve area p 1/2 method 6.91 cm2    E/A ratio 1.59     Mean e' 0.10 m/s    E wave deceleration time 109.77 msec    IVRT 122.26 msec    Pulm vein S/D ratio 1.00     LVOT diameter 2.06 cm    LVOT area 3.3 cm2    LVOT peak broderick 1.09 m/s    LVOT peak VTI 23.40 cm    Ao peak broderick 1.35 m/s    Ao VTI 28.35 cm    LVOT stroke volume 77.95 cm3    AV peak gradient 7 mmHg    E/E' ratio 9.37 m/s    MV Peak E Broderick 0.89 m/s    TR Max Broderick 2.13 m/s    MV stenosis pressure 1/2 time 31.83 ms    MV Peak A Broderick 0.56 m/s    PV Peak S Broderick 0.43 m/s    PV Peak D Broderick 0.43 m/s    LV Systolic Volume 53.24 mL    LV Systolic Volume Index 23.6 mL/m2    LV Diastolic Volume 115.94 mL    LV Diastolic Volume Index 51.30 mL/m2    LA Volume Index 22.7 mL/m2    LV Mass Index 81 g/m2    RA Major Axis 4.65 cm    Left Atrium Minor Axis 4.66 cm    Left Atrium Major Axis 4.79 cm    Triscuspid Valve Regurgitation Peak Gradient 18 mmHg    RA Width 3.25 cm    Right Atrial Pressure (from IVC) 3 mmHg    EF 55 %    TV rest pulmonary artery pressure 21 mmHg    Narrative    · The estimated ejection fraction is 55%.  · Normal systolic function.  · Normal left ventricular diastolic function.  · Normal right ventricular size with normal right ventricular systolic   function.  · Mild left atrial enlargement.  · Normal central venous pressure (3 mmHg).  · The estimated PA systolic  pressure is 21 mmHg.        Assessment and Plan:     Brief HPI:     * Acute renal failure  Management per primary team/Nephrology.  Creatinine back to 1.4 now    Esophagitis, Gibson grade C        Cocaine abuse   Cessation has been recommended    GI hemorrhage          Elevated troponin  Elevated troponin in the setting of hematemesis, acute renal failure, hyperkalemia.  No chest pains.  Check 2D echo.  At the current time because of active hematemesis do not recommend anticoagulation or antiplatelet therapy.  May initiate after GI workup and clearance from Gastroenterology.  In the absence of chest pain and mildly elevated troponins, likely to be type 2.  But patient does have multiple risk factors for coronary artery disease and will need ischemic workup once renal failure and GI bleeding has resolved.    6/29:  Initiate aspirin Plavix when cleared by GI.  Cardiac catheterization after that.    Tobacco use        Hyperkalemia  Being treated by primary team.  Recommend potassium levels less than 5        VTE Risk Mitigation (From admission, onward)         Ordered     IP VTE LOW RISK PATIENT  Once         06/28/22 1511                Jose Armando Juarez MD  Cardiology  [facility] - Intensive Care      Critical Care Time:  35 minutes     Critical care was time spent personally by me on the following activities: development of treatment plan with patient or surrogate and bedside caregivers, discussions with consultants, evaluation of patient's response to treatment, examination of patient, ordering and performing treatments and interventions, ordering and review of laboratory studies, ordering and review of radiographic studies, pulse oximetry, re-evaluation of patient's condition. This critical care time did not overlap with that of any other provider or involve time for any procedures.

## 2022-06-29 NOTE — NURSING
[facility] [place]  Nurses Note -- 4 Eyes      6/29/2022       Skin assessed on: Wound Wednesday      [x] No Pressure Injuries Present    [x]Prevention Measures Documented    [] Yes LDA  for Pressure Injury Previously documented     [] Yes New Pressure Injury Discovered   [] LDA for New Pressure Injury Added      Attending RN:  Toshia Coulter RN     Second RN:  Princess Contreras RN

## 2022-06-29 NOTE — SUBJECTIVE & OBJECTIVE
Past Medical History:   Diagnosis Date    Acute renal failure 6/28/2022       History reviewed. No pertinent surgical history.    Review of patient's allergies indicates:  No Known Allergies    No current facility-administered medications on file prior to encounter.     Current Outpatient Medications on File Prior to Encounter   Medication Sig    [DISCONTINUED] hydrocodone-acetaminophen 7.5-325mg (NORCO) 7.5-325 mg per tablet Take 1 tablet by mouth every 6 (six) hours as needed for Pain.     Family History       Problem Relation (Age of Onset)    No Known Problems Mother          Tobacco Use    Smoking status: Current Every Day Smoker    Smokeless tobacco: Never Used   Substance and Sexual Activity    Alcohol use: Yes    Drug use: Yes     Types: Cocaine    Sexual activity: Not on file     Review of Systems   Constitutional: Negative.   HENT: Negative.     Eyes: Negative.    Cardiovascular: Negative.  Negative for chest pain.   Respiratory: Negative.     Endocrine: Negative.    Hematologic/Lymphatic: Negative.    Skin: Negative.    Musculoskeletal: Negative.    Gastrointestinal:  Positive for heartburn and hematemesis.   Genitourinary: Negative.    Neurological: Negative.    Psychiatric/Behavioral: Negative.     Allergic/Immunologic: Negative.    Objective:     Vital Signs (Most Recent):  Temp: 98.7 °F (37.1 °C) (06/28/22 1650)  Pulse: 99 (06/28/22 1819)  Resp: 20 (06/28/22 1819)  BP: 122/77 (06/28/22 1819)  SpO2: (!) 90 % (06/28/22 1819)   Vital Signs (24h Range):  Temp:  [98.7 °F (37.1 °C)-99.2 °F (37.3 °C)] 98.7 °F (37.1 °C)  Pulse:  [] 99  Resp:  [12-20] 20  SpO2:  [86 %-97 %] 90 %  BP: (119-148)/(72-93) 122/77     Weight: 102.2 kg (225 lb 5 oz)  Body mass index is 29.73 kg/m².    SpO2: (!) 90 %  O2 Device (Oxygen Therapy): nasal cannula      Intake/Output Summary (Last 24 hours) at 6/28/2022 1929  Last data filed at 6/28/2022 1800  Gross per 24 hour   Intake 2102.18 ml   Output --   Net 2102.18 ml        Lines/Drains/Airways       Peripheral Intravenous Line  Duration                  Peripheral IV - Single Lumen 06/28/22 1315 20 G Posterior;Right Hand <1 day         Peripheral IV - Single Lumen 06/28/22 1600 20 G Right Antecubital <1 day                    Physical Exam  Vitals reviewed.   Constitutional:       Appearance: He is well-developed.   HENT:      Head: Normocephalic.   Eyes:      Conjunctiva/sclera: Conjunctivae normal.      Pupils: Pupils are equal, round, and reactive to light.   Cardiovascular:      Rate and Rhythm: Normal rate and regular rhythm.      Heart sounds: Normal heart sounds.   Pulmonary:      Effort: Pulmonary effort is normal.      Breath sounds: Normal breath sounds.   Abdominal:      General: Bowel sounds are normal.      Palpations: Abdomen is soft.   Musculoskeletal:      Cervical back: Normal range of motion and neck supple.   Skin:     General: Skin is warm.   Neurological:      Mental Status: He is alert and oriented to person, place, and time.       Significant Labs: BMP:   Recent Labs   Lab 06/28/22  1319 06/28/22  1816    132*    140   K 7.3* 5.8*    105   CO2 20* 23   BUN 22* 24*   CREATININE 3.9* 2.9*   CALCIUM 9.1 8.4*   , CMP   Recent Labs   Lab 06/28/22  1319 06/28/22  1816    140   K 7.3* 5.8*    105   CO2 20* 23    132*   BUN 22* 24*   CREATININE 3.9* 2.9*   CALCIUM 9.1 8.4*   PROT 9.0*  --    ALBUMIN 4.4  --    BILITOT 0.4  --    ALKPHOS 53*  --    AST 78*  --    ALT 49*  --    ANIONGAP 16 12   ESTGFRAFRICA 21* 30*   EGFRNONAA 18* 26*   , CBC   Recent Labs   Lab 06/28/22  1319   WBC 14.74*   HGB 16.8   HCT 51.1      , INR   Recent Labs   Lab 06/28/22  1319   INR 1.0   , Lipid Panel No results for input(s): CHOL, HDL, LDLCALC, TRIG, CHOLHDL in the last 48 hours., Troponin   Recent Labs   Lab 06/28/22  1319 06/28/22  1624   TROPONINI 0.544* 0.611*   , and All pertinent lab results from the last 24 hours have been  reviewed.    Significant Imaging: Echocardiogram: Transthoracic echo (TTE) complete (Cupid Only): No results found for this or any previous visit.

## 2022-06-29 NOTE — HOSPITAL COURSE
Mr Feliberto Crowley was admitted with hematemesis, acute renal failure with hyperkalemia, and elevated troponin. Started PPI, octreotide, IVF. EGD 6/29 showed esophagitis. GI recommending PPI BID. MI resolved with IVF. Cardiology consulted for elevated troponin. LHC 6/30 showed non obstructive CAD- 30% mid RCA stenosis not requiring stent. Discussed with Cardiology- due to bleeding, hold on asa/plavix but start statin upon discharge. He is stable for discharge to home with PCP follow up.

## 2022-06-29 NOTE — CONSULTS
[facility] - Intensive Care  Cardiology  Consult Note    Patient Name: Feliberto Crowley  MRN: [id number]  Admission Date: 6/28/2022  Hospital Length of Stay: 0 days  Code Status: Full Code   Attending Provider: Pepe Devries MD   Consulting Provider: Jose Armando Juarez MD  Primary Care Physician: Primary Doctor No  Principal Problem:Acute renal failure    Patient information was obtained from patient and ER records.     Inpatient consult to Cardiology  Consult performed by: Jose Armando Juarez MD  Consult ordered by: Stan Goss MD        Subjective:     Chief Complaint:  Gi bleed     HPI:   Mr. Crowley is a 41 year old male with no reported past medical history who presents to the emergency department for evaluation vomiting dark brown substance.  Patient states he drink a few beers last night and went to sleep.  He said he woke feeling nauseous and vomited everywhere.  He was also feeling weak so he came to the emergency department for further evaluation.  He tells me he snorts cocaine occasionally but is unsure if he did this yesterday.  He denies any other drug use.  He does smoke cigarettes. He denies taking any blood thinners. He is unsure of what exactly happened. 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 Has not been out in the heat. Denies any renal issues. Patient denies chest pain, states he had some heart burn earlier. Maybe some abdominal pain/discomfort but none now. No fevers or chills.   In the ED, he was found to be hypoxic and vomited brown substance. He was placed on nasal cannula with improvement. He was also found to have Cr of 3.9, K of 7.3, troponin of 0.544 and Hb of 16. Alcohol level was <10. He was given insulin, bicarb, lokelma, and dextrose as well as albuterol treatment. He was given 2 liters NS.     Patient denies any anginal sounding chest pains.  Troponins mildly elevated.  Had an episode of hematemesis in the ER also and 1 episode at  home.  States that he snorts cocaine about once a month.  EKG without evidence of ST elevation.      Past Medical History:   Diagnosis Date    Acute renal failure 6/28/2022       History reviewed. No pertinent surgical history.    Review of patient's allergies indicates:  No Known Allergies    No current facility-administered medications on file prior to encounter.     Current Outpatient Medications on File Prior to Encounter   Medication Sig    [DISCONTINUED] hydrocodone-acetaminophen 7.5-325mg (NORCO) 7.5-325 mg per tablet Take 1 tablet by mouth every 6 (six) hours as needed for Pain.     Family History       Problem Relation (Age of Onset)    No Known Problems Mother          Tobacco Use    Smoking status: Current Every Day Smoker    Smokeless tobacco: Never Used   Substance and Sexual Activity    Alcohol use: Yes    Drug use: Yes     Types: Cocaine    Sexual activity: Not on file     Review of Systems   Constitutional: Negative.   HENT: Negative.     Eyes: Negative.    Cardiovascular: Negative.  Negative for chest pain.   Respiratory: Negative.     Endocrine: Negative.    Hematologic/Lymphatic: Negative.    Skin: Negative.    Musculoskeletal: Negative.    Gastrointestinal:  Positive for heartburn and hematemesis.   Genitourinary: Negative.    Neurological: Negative.    Psychiatric/Behavioral: Negative.     Allergic/Immunologic: Negative.    Objective:     Vital Signs (Most Recent):  Temp: 98.7 °F (37.1 °C) (06/28/22 1650)  Pulse: 99 (06/28/22 1819)  Resp: 20 (06/28/22 1819)  BP: 122/77 (06/28/22 1819)  SpO2: (!) 90 % (06/28/22 1819)   Vital Signs (24h Range):  Temp:  [98.7 °F (37.1 °C)-99.2 °F (37.3 °C)] 98.7 °F (37.1 °C)  Pulse:  [] 99  Resp:  [12-20] 20  SpO2:  [86 %-97 %] 90 %  BP: (119-148)/(72-93) 122/77     Weight: 102.2 kg (225 lb 5 oz)  Body mass index is 29.73 kg/m².    SpO2: (!) 90 %  O2 Device (Oxygen Therapy): nasal cannula      Intake/Output Summary (Last 24 hours) at 6/28/2022  1929  Last data filed at 6/28/2022 1800  Gross per 24 hour   Intake 2102.18 ml   Output --   Net 2102.18 ml       Lines/Drains/Airways       Peripheral Intravenous Line  Duration                  Peripheral IV - Single Lumen 06/28/22 1315 20 G Posterior;Right Hand <1 day         Peripheral IV - Single Lumen 06/28/22 1600 20 G Right Antecubital <1 day                    Physical Exam  Vitals reviewed.   Constitutional:       Appearance: He is well-developed.   HENT:      Head: Normocephalic.   Eyes:      Conjunctiva/sclera: Conjunctivae normal.      Pupils: Pupils are equal, round, and reactive to light.   Cardiovascular:      Rate and Rhythm: Normal rate and regular rhythm.      Heart sounds: Normal heart sounds.   Pulmonary:      Effort: Pulmonary effort is normal.      Breath sounds: Normal breath sounds.   Abdominal:      General: Bowel sounds are normal.      Palpations: Abdomen is soft.   Musculoskeletal:      Cervical back: Normal range of motion and neck supple.   Skin:     General: Skin is warm.   Neurological:      Mental Status: He is alert and oriented to person, place, and time.       Significant Labs: BMP:   Recent Labs   Lab 06/28/22  1319 06/28/22  1816    132*    140   K 7.3* 5.8*    105   CO2 20* 23   BUN 22* 24*   CREATININE 3.9* 2.9*   CALCIUM 9.1 8.4*   , CMP   Recent Labs   Lab 06/28/22  1319 06/28/22  1816    140   K 7.3* 5.8*    105   CO2 20* 23    132*   BUN 22* 24*   CREATININE 3.9* 2.9*   CALCIUM 9.1 8.4*   PROT 9.0*  --    ALBUMIN 4.4  --    BILITOT 0.4  --    ALKPHOS 53*  --    AST 78*  --    ALT 49*  --    ANIONGAP 16 12   ESTGFRAFRICA 21* 30*   EGFRNONAA 18* 26*   , CBC   Recent Labs   Lab 06/28/22  1319   WBC 14.74*   HGB 16.8   HCT 51.1      , INR   Recent Labs   Lab 06/28/22  1319   INR 1.0   , Lipid Panel No results for input(s): CHOL, HDL, LDLCALC, TRIG, CHOLHDL in the last 48 hours., Troponin   Recent Labs   Lab 06/28/22  1319  06/28/22  1624   TROPONINI 0.544* 0.611*   , and All pertinent lab results from the last 24 hours have been reviewed.    Significant Imaging: Echocardiogram: Transthoracic echo (TTE) complete (Cupid Only): No results found for this or any previous visit.    Assessment and Plan:     * Acute renal failure  Management per primary team/Nephrology    Cocaine abuse  Cessation has been recommended    History of hematemesis        Elevated troponin  Elevated troponin in the setting of hematemesis, acute renal failure, hyperkalemia.  No chest pains.  Check 2D echo.  At the current time because of active hematemesis do not recommend anticoagulation or antiplatelet therapy.  May initiate after GI workup and clearance from Gastroenterology.  In the absence of chest pain and mildly elevated troponins, likely to be type 2.  But patient does have multiple risk factors for coronary artery disease and will need ischemic workup once renal failure and GI bleeding has resolved.    Tobacco use        Hyperkalemia  Being treated by primary team.  Recommend potassium levels less than 5        VTE Risk Mitigation (From admission, onward)         Ordered     IP VTE LOW RISK PATIENT  Once         06/28/22 1511                Thank you for your consult. I will follow-up with patient. Please contact us if you have any additional questions.    Jose Armando Juarez MD  Cardiology   [facility] - [place] - Intensive Care      Critical Care Time:  45 minutes     Critical care was time spent personally by me on the following activities: development of treatment plan with patient or surrogate and bedside caregivers, discussions with consultants, evaluation of patient's response to treatment, examination of patient, ordering and performing treatments and interventions, ordering and review of laboratory studies, ordering and review of radiographic studies, pulse oximetry, re-evaluation of patient's condition. This critical care time did not overlap with that of any  other provider or involve time for any procedures.

## 2022-06-29 NOTE — PROVIDER TRANSFER
Transfer Note    40 y/o male presented with hematemesis.  Patient noted to be in ARF with hyperkalemia on presentation.  Started on IVF hydration with improvement of renal function.  Elevated Troponin and Cardiology consulted.  Hyperkalemia resolved.  Started on IV PPI and Octreotide drip with GI consult.  Some drop in H/H, but may be related to hemodilution.  Hemodynamically stable.  Plan for EGD today.  If unremarkable, LHC tomorrow if cleared by GI for ASA and Plavix.

## 2022-06-29 NOTE — CONSULTS
[facility] - Intensive Care  Gastroenterology  Consult Note    Patient Name: Feliberto Crowley  MRN: [id number]  Admission Date: 6/28/2022  Hospital Length of Stay: 1 days  Code Status: Full Code   Attending Provider: Dr Devries  Consulting Provider: Serjio West MD  Primary Care Physician: Primary Doctor No  Principal Problem:Acute renal failure    Inpatient consult to Gastroenterology  Consult performed by: Serjio West MD  Consult ordered by: Stan Goss MD  Reason for consult: hematemesis  Assessment/Recommendations: 41-year-old gentleman with hematemesis likely due to either Madeleine-Nugent tear or reflux esophagitis.  Hemodynamically stable.  Will proceed with EGD today        Subjective:     HPI:  41-year-old gentleman with no significant gastrointestinal history, however has a long history acid reflux manifest as heartburn and pyrosis.  For this he has been taking baking soda at home and Tums while at work.  He has never sought medical care for this.  Yesterday he was feeling thirsty and possibly dizzy.  And at that point he was also experiencing some upper abdominal discomfort.  In that setting he either had regurgitation or a non forceful episode of emesis where he brought up water and blood.  He had a 2nd similar episode rule he brought up coffee-ground looking material.  At no time did he  describe forceful retching.  I asked about black tarry stools and he says he has had them in the past but not recently.  This morning he is feeling comfortable.  He denies nausea or heartburn now.  No abdominal pain.  And no tarry stool since being admitted to the hospital.  He was admitted to the intensive care unit because of the mild elevation of troponin which cardiology does not think is significant along with electrolyte abnormalities and acute kidney insufficiency.  Those electrolyte abnormalities have been corrected along with his creatinine.    Past Medical History:   Diagnosis Date    Acute renal failure  6/28/2022       History reviewed. No pertinent surgical history.    Review of patient's allergies indicates:  No Known Allergies  Family History     Problem Relation (Age of Onset)    No Known Problems Mother        Tobacco Use    Smoking status: Current Every Day Smoker    Smokeless tobacco: Never Used   Substance and Sexual Activity    Alcohol use: Yes    Drug use: Yes     Types: Cocaine    Sexual activity: Not on file     Review of Systems   Constitutional: Negative for activity change, appetite change, chills, diaphoresis, fatigue, fever and unexpected weight change.   HENT: Negative for congestion, ear pain, mouth sores, rhinorrhea, sinus pressure, sneezing, sore throat, trouble swallowing and voice change.    Eyes: Negative for pain.   Respiratory: Negative for cough and shortness of breath.    Cardiovascular: Negative for chest pain, palpitations and leg swelling.   Genitourinary: Negative for difficulty urinating and flank pain.   Musculoskeletal: Negative for arthralgias, back pain, gait problem, joint swelling, myalgias and neck pain.   Skin: Negative for rash.   Neurological: Positive for numbness. Negative for dizziness, tremors, syncope and headaches.   Hematological: Negative for adenopathy. Does not bruise/bleed easily.   Psychiatric/Behavioral: Negative for agitation, behavioral problems, confusion, decreased concentration and dysphoric mood.     Objective:     Vital Signs (Most Recent):  Temp: 97.6 °F (36.4 °C) (06/29/22 0701)  Pulse: 82 (06/29/22 0701)  Resp: 13 (06/29/22 0701)  BP: 135/84 (06/29/22 0701)  SpO2: 99 % (06/29/22 0701) Vital Signs (24h Range):  Temp:  [97 °F (36.1 °C)-99.2 °F (37.3 °C)] 97.6 °F (36.4 °C)  Pulse:  [] 82  Resp:  [11-36] 13  SpO2:  [86 %-100 %] 99 %  BP: (108-148)/(72-93) 135/84     Weight: 102.2 kg (225 lb 5 oz) (06/28/22 1650)  Body mass index is 29.73 kg/m².      Intake/Output Summary (Last 24 hours) at 6/29/2022 0914  Last data filed at 6/29/2022  0701  Gross per 24 hour   Intake 2232.27 ml   Output 900 ml   Net 1332.27 ml       Lines/Drains/Airways     Peripheral Intravenous Line  Duration                Peripheral IV - Single Lumen 06/28/22 1315 20 G Left Antecubital <1 day         Peripheral IV - Single Lumen 06/28/22 1600 20 G Right Antecubital <1 day                Physical Exam  Constitutional:       General: He is not in acute distress.     Appearance: Normal appearance. He is well-developed. He is not diaphoretic.   HENT:      Right Ear: External ear normal.      Left Ear: External ear normal.      Nose: Nose normal.      Mouth/Throat:      Pharynx: No oropharyngeal exudate.   Eyes:      General: No scleral icterus.     Conjunctiva/sclera: Conjunctivae normal.      Pupils: Pupils are equal, round, and reactive to light.   Neck:      Thyroid: No thyromegaly.   Cardiovascular:      Rate and Rhythm: Normal rate.      Heart sounds: Normal heart sounds. No murmur heard.    No gallop.   Pulmonary:      Effort: Pulmonary effort is normal.      Breath sounds: Normal breath sounds. No wheezing.   Abdominal:      General: Bowel sounds are normal. There is no distension.      Palpations: Abdomen is soft. Abdomen is not rigid. There is no fluid wave or mass.      Tenderness: There is no abdominal tenderness. There is no guarding or rebound.   Genitourinary:     Comments: recent  Musculoskeletal:         General: No tenderness. Normal range of motion.      Cervical back: Normal range of motion and neck supple.   Lymphadenopathy:      Cervical: No cervical adenopathy.   Skin:     General: Skin is warm.      Findings: No rash.   Neurological:      Mental Status: He is alert and oriented to person, place, and time.      Cranial Nerves: No cranial nerve deficit.      Deep Tendon Reflexes: Reflexes are normal and symmetric.   Psychiatric:         Behavior: Behavior normal.         Thought Content: Thought content normal.         Significant Labs:  CBC:   Recent Labs    Lab 06/28/22  1319 06/29/22  0636   WBC 14.74* 11.73   HGB 16.8 13.7*   HCT 51.1 41.3    156     BMP:   Recent Labs   Lab 06/29/22  0606   *      K 4.5      CO2 26   BUN 22*   CREATININE 1.4   CALCIUM 8.7   MG 2.1     CMP:   Recent Labs   Lab 06/29/22  0606   *   CALCIUM 8.7   ALBUMIN 3.5   PROT 6.9      K 4.5   CO2 26      BUN 22*   CREATININE 1.4   ALKPHOS 41*   ALT 65*   *   BILITOT 0.5     Coagulation:   Recent Labs   Lab 06/28/22  1319   INR 1.0   APTT 26.1       Significant Imaging:      Assessment/Plan:     Active Diagnoses:    Diagnosis Date Noted POA    PRINCIPAL PROBLEM:  Acute renal failure [N17.9] 06/28/2022 Yes    Hyperkalemia [E87.5] 06/28/2022 Yes    Tobacco use [Z72.0] 06/28/2022 Yes    Elevated troponin [R77.8] 06/28/2022 Yes    History of hematemesis [Z87.19] 06/28/2022 Not Applicable    Hypoxia [R09.02] 06/28/2022 Yes    Cocaine abuse [F14.10] 06/28/2022 Yes      Problems Resolved During this Admission:       41-year-old gentleman with acute upper GI bleeding.  Because of the alcohol history and the elevated transaminases, there was concern that this could be a variceal hemorrhage and he was started on octreotide in the emergency room.  I think a variceal hemorrhage is unlikely as his platelet count is normal.  His INR is normal.  And there is no ascites on physical exam.  He does have this long history of acid reflux and I suspect he has significant ulcerative esophagitis, and that is where the bleeding came from.  It is also possible this is from a Madeleine-Nugent tear.  Fortunately he has a strong hemoglobin and has only sustained a mild fall in the hemoglobin.  I would recommend we proceed with EGD today and then after that he will likely be able to resume diet, discontinue octreotide, and we can address long-term recommendations.  I will have a conference with anesthesia prior to the EGD.    Thank you for your consult. I will sign  off. Please contact us if you have any additional questions.    Serjio West MD  Gastroenterology  [facility] - [place] - Intensive Care

## 2022-06-29 NOTE — ANESTHESIA POSTPROCEDURE EVALUATION
Anesthesia Post Evaluation    Patient: Feliberto Crowley    Procedure(s) Performed: Procedure(s) (LRB):  EGD (ESOPHAGOGASTRODUODENOSCOPY) (N/A)    Final Anesthesia Type: general      Patient location during evaluation: GI PACU  Patient participation: Yes- Able to Participate  Level of consciousness: awake and alert and oriented  Post-procedure vital signs: reviewed and stable  Pain management: adequate  Airway patency: patent    PONV status at discharge: No PONV  Anesthetic complications: no      Cardiovascular status: blood pressure returned to baseline and hemodynamically stable  Respiratory status: unassisted, spontaneous ventilation and room air  Hydration status: euvolemic  Follow-up not needed.          Vitals Value Taken Time   /79 06/29/22 1432   Temp 36 °C (96.8 °F) 06/29/22 1317   Pulse 82 06/29/22 1436   Resp 14 06/29/22 1436   SpO2 97 % 06/29/22 1436   Vitals shown include unvalidated device data.      No case tracking events are documented in the log.      Pain/Yunier Score: No data recorded

## 2022-06-29 NOTE — PLAN OF CARE
[facility] - Intensive Care  Initial Discharge Assessment    Patient independent from home alone. Pt stated his sister will be help at home. Pt is accepting to [facility] for pcp. No current dme at home. TN to continue to follow for discharge needs.        Primary Care Provider: Primary Doctor No    Admission Diagnosis: Vomiting [R11.10]  Acute renal failure [N17.9]  Elevated troponin [R77.8]    Admission Date: 6/28/2022  Expected Discharge Date:     Discharge Barriers Identified: (P) None    Payor: MEDICAID / Plan: PENDING MEDICAID / Product Type: Government /     Extended Emergency Contact Information  Primary Emergency Contact: Eva Crowley   [place]  Work Phone: [phone]  Mobile Phone: [phone]  Relation: Sister    Discharge Plan A: (P) Home  Discharge Plan B: (P)  (TBD)      [id number] - [place], [place] - [address] AT [facility]  [address]  [place] [address]  Phone: [phone] Fax: [phone]      Initial Assessment (most recent)       Adult Discharge Assessment - 06/29/22 1437          Discharge Assessment    Assessment Type Discharge Planning Assessment (P)      Confirmed/corrected address, phone number and insurance Yes (P)      Confirmed Demographics Correct on Facesheet (P)      Source of Information patient (P)      When was your last doctors appointment? -- (P)    Will need pcp set up    Does patient/caregiver understand observation status No (P)      Was observation education provided? No (P)      Communicated VISHNU with patient/caregiver Date not available/Unable to determine (P)      Reason For Admission Acute renal failure (P)      Lives With alone (P)      Facility Arrived From: Home (P)      Do you have help at home or someone to help you manage your care at home? Yes (P)      Who are your caregiver(s) and their phone number(s)? Brannon Melo (sister) [phone] (P)      Prior to hospitilization cognitive status:  Alert/Oriented (P)      Current cognitive status: Alert/Oriented (P)      Walking or Climbing Stairs Difficulty none (P)      Dressing/Bathing Difficulty none (P)      Equipment Currently Used at Home none (P)      Readmission within 30 days? No (P)      Patient currently being followed by outpatient case management? No (P)      Do you currently have service(s) that help you manage your care at home? No (P)      Do you take prescription medications? No (P)      Do you have prescription coverage? No (P)      Do you have any problems affording any of your prescribed medications? TBD (P)      Who is going to help you get home at discharge? Brannon Melo (sister) (P)      How do you get to doctors appointments? car, drives self (P)      Are you on dialysis? No (P)      Do you take coumadin? No (P)      Discharge Plan A Home (P)      Discharge Plan B -- (P)    TBD    DME Needed Upon Discharge  -- (P)    TBD    Discharge Plan discussed with: Patient (P)      Discharge Barriers Identified None (P)

## 2022-06-29 NOTE — ASSESSMENT & PLAN NOTE
Elevated troponin in the setting of hematemesis, acute renal failure, hyperkalemia.  No chest pains.  Check 2D echo.  At the current time because of active hematemesis do not recommend anticoagulation or antiplatelet therapy.  May initiate after GI workup and clearance from Gastroenterology.  In the absence of chest pain and mildly elevated troponins, likely to be type 2.  But patient does have multiple risk factors for coronary artery disease and will need ischemic workup once renal failure and GI bleeding has resolved.

## 2022-06-29 NOTE — ASSESSMENT & PLAN NOTE
Elevated troponin in the setting of hematemesis, acute renal failure, hyperkalemia.  No chest pains.  Check 2D echo.  At the current time because of active hematemesis do not recommend anticoagulation or antiplatelet therapy.  May initiate after GI workup and clearance from Gastroenterology.  In the absence of chest pain and mildly elevated troponins, likely to be type 2.  But patient does have multiple risk factors for coronary artery disease and will need ischemic workup once renal failure and GI bleeding has resolved.    6/29:  Initiate aspirin Plavix when cleared by GI.  Cardiac catheterization after that.

## 2022-06-29 NOTE — PROVATION PATIENT INSTRUCTIONS
Discharge Summary/Instructions after an Endoscopic Procedure  Patient Name: Feliberto Crowley  Patient MRN: [id number]  Patient YOB: 1980  Wednesday, June 29, 2022  Serjio West MD  Dear patient,  As a result of recent federal legislation (The Federal Cures Act), you may   receive lab or pathology results from your procedure in your [facility]   account before your physician is able to contact you. Your physician or   their representative will relay the results to you with their   recommendations at their soonest availability.  Thank you,  RESTRICTIONS:  During your procedure today, you received medications for sedation.  These   medications may affect your judgment, balance and coordination.  Therefore,   for 24 hours, you have the following restrictions:   - DO NOT drive a car, operate machinery, make legal/financial decisions,   sign important papers or drink alcohol.    ACTIVITY:  Today: no heavy lifting, straining or running due to procedural   sedation/anesthesia.  The following day: return to full activity including work.  DIET:  Eat and drink normally unless instructed otherwise.     TREATMENT FOR COMMON SIDE EFFECTS:  - Mild abdominal pain, nausea, belching, bloating or excessive gas:  rest,   eat lightly and use a heating pad.  - Sore Throat: treat with throat lozenges and/or gargle with warm salt   water.  - Because air was used during the procedure, expelling large amounts of air   from your rectum or belching is normal.  - If a bowel prep was taken, you may not have a bowel movement for 1-3 days.    This is normal.  SYMPTOMS TO WATCH FOR AND REPORT TO YOUR PHYSICIAN:  1. Abdominal pain or bloating, other than gas cramps.  2. Chest pain.  3. Back pain.  4. Signs of infection such as: chills or fever occurring within 24 hours   after the procedure.  5. Rectal bleeding, which would show as bright red, maroon, or black stools.   (A tablespoon of blood from the rectum is not serious, especially if    hemorrhoids are present.)  6. Vomiting.  7. Weakness or dizziness.  GO DIRECTLY TO THE NEAREST EMERGENCY ROOM IF YOU HAVE ANY OF THE FOLLOWING:      Difficulty breathing              Chills and/or fever over 101 F   Persistent vomiting and/or vomiting blood   Severe abdominal pain   Severe chest pain   Black, tarry stools   Bleeding- more than one tablespoon   Any other symptom or condition that you feel may need urgent attention  Your doctor recommends these additional instructions:  If any biopsies were taken, your doctors clinic will contact you in 1 to 2   weeks with any results.  - Return patient to ICU for ongoing care.   - Clear liquid diet.   - Use Protonix (pantoprazole) 80 mg IV BID.  For questions, problems or results please call your physician - Serjio West MD at Work:  [phone].  [facility] can be reached at [phone]     IF A COMPLICATION OR EMERGENCY SITUATION ARISES AND YOU ARE UNABLE TO REACH   YOUR PHYSICIAN - GO DIRECTLY TO THE EMERGENCY ROOM.  Serjio West MD  6/29/2022 1:07:16 PM  This report has been verified and signed electronically.  Dear patient,  As a result of recent federal legislation (The Federal Cures Act), you may   receive lab or pathology results from your procedure in your [facility]   account before your physician is able to contact you. Your physician or   their representative will relay the results to you with their   recommendations at their soonest availability.  Thank you,  PROVATION

## 2022-06-29 NOTE — NURSING TRANSFER
Nursing Transfer Note      6/29/2022     Reason patient is being transferred: EGD    Transfer To: endoscopy    Transfer via bed    Transfer with cardiac monitoring    Transported by Endo team    Medicines sent: none    Any special needs or follow-up needed: none    Chart send with patient: Yes    Notified: pt

## 2022-06-29 NOTE — ASSESSMENT & PLAN NOTE
"Patient reported dark colored vomit with specks of blood. Nurse reported "Coffee ground"  Drop in H/H, but unsure if this may be related to hemodilution.  - on PPI IV  - GI consulted and started on octreotide  Plan for EGD today.    "

## 2022-06-29 NOTE — SUBJECTIVE & OBJECTIVE
Interval History: Feeling better.    Review of Systems   HENT:  Negative for ear discharge and ear pain.    Eyes:  Negative for discharge and itching.   Endocrine: Negative for cold intolerance and heat intolerance.   Neurological:  Negative for seizures and syncope.   Objective:     Vital Signs (Most Recent):  Temp: 97.6 °F (36.4 °C) (06/29/22 0701)  Pulse: 82 (06/29/22 0701)  Resp: 13 (06/29/22 0701)  BP: 135/84 (06/29/22 0701)  SpO2: 99 % (06/29/22 0701) Vital Signs (24h Range):  Temp:  [97 °F (36.1 °C)-99.2 °F (37.3 °C)] 97.6 °F (36.4 °C)  Pulse:  [] 82  Resp:  [11-36] 13  SpO2:  [86 %-100 %] 99 %  BP: (108-148)/(72-93) 135/84     Weight: 102.2 kg (225 lb 5 oz)  Body mass index is 29.73 kg/m².    Intake/Output Summary (Last 24 hours) at 6/29/2022 0948  Last data filed at 6/29/2022 0701  Gross per 24 hour   Intake 2232.27 ml   Output 900 ml   Net 1332.27 ml      Physical Exam  Vitals and nursing note reviewed.   Constitutional:       General: He is not in acute distress.     Appearance: Normal appearance. He is not diaphoretic.   HENT:      Head: Normocephalic.      Mouth/Throat:      Pharynx: Oropharynx is clear.   Eyes:      General: No scleral icterus.  Cardiovascular:      Rate and Rhythm: Normal rate and regular rhythm.      Pulses: Normal pulses.      Heart sounds: Normal heart sounds. No murmur heard.  Pulmonary:      Effort: Pulmonary effort is normal.      Breath sounds: No wheezing.      Comments: Poor air movement  Abdominal:      General: Bowel sounds are normal. There is no distension.      Palpations: Abdomen is soft.      Tenderness: There is no abdominal tenderness.   Musculoskeletal:         General: No swelling. Normal range of motion.   Skin:     General: Skin is warm and dry.   Neurological:      Mental Status: He is alert and oriented to person, place, and time.      Motor: No weakness.   Psychiatric:         Attention and Perception: Attention normal.         Speech: Speech normal.          Behavior: Behavior normal. Behavior is cooperative.         Thought Content: Thought content normal.       Significant Labs: All pertinent labs within the past 24 hours have been reviewed.  BMP:   Recent Labs   Lab 06/29/22  0606   *      K 4.5      CO2 26   BUN 22*   CREATININE 1.4   CALCIUM 8.7   MG 2.1     CBC:   Recent Labs   Lab 06/28/22  1319 06/29/22  0636   WBC 14.74* 11.73   HGB 16.8 13.7*   HCT 51.1 41.3    156       Significant Imaging: I have reviewed all pertinent imaging results/findings within the past 24 hours.

## 2022-06-29 NOTE — CARE UPDATE
[facility] Intensive Care  ICU Shift Summary  Date: 6/29/2022      Prehospitalization: Home  Admit Date / LOS : 6/28/2022/ 1 days    Diagnosis: Acute renal failure    Consults:        Active: Cardio and GI       Needed: N/A     Code Status: Full Code   Advanced Directive: <no information>    LDA:  Lines/Drains/Airways     Peripheral Intravenous Line  Duration                Peripheral IV - Single Lumen 06/28/22 1315 20 G Left Antecubital 1 day         Peripheral IV - Single Lumen 06/28/22 1600 20 G Right Antecubital 1 day              Central Lines/Site/Justification:Patient Does Not Have Central Line  Urinary Cath/Order/Justification:Patient Does Not Have Urinary Catheter    Vasopressors/Infusions:        GOALS: Volume/ Hemodynamic: N/A                     RASS: N/A    Pain Management: none       Pain Controlled: not applicable     Rhythm: NSR    Respiratory Device: Nasal Cannula                  Most Recent SBT/ SAT: N/A       MOVE Screen: PASS  ICU Liberation: not applicable    VTE Prophylaxis: Ambulation  Mobility: Ambulatory and S: Self  Stress Ulcer Prophylaxis: Yes    Isolation: No active isolations    Dietary:   Current Diet Order   Procedures    Diet full liquid    Diet NPO      Tolerance: not applicable  Advancement: yes    I & O (24h):    Intake/Output Summary (Last 24 hours) at 6/29/2022 1810  Last data filed at 6/29/2022 1600  Gross per 24 hour   Intake 372.58 ml   Output 2250 ml   Net -1877.42 ml        Restraints: No    Significant Dates:  Post Op Date: N/A  Rescue Date: N/A  Imaging/ Diagnostics: N/A    Noteworthy Labs:  none    COVID Test: (--)  CBC/Anemia Labs: Coags:    Recent Labs   Lab 06/28/22  1319 06/29/22  0636   WBC 14.74* 11.73   HGB 16.8 13.7*   HCT 51.1 41.3    156   MCV 92 92   RDW 15.5* 15.2*    Recent Labs   Lab 06/28/22  1319   INR 1.0   APTT 26.1        Chemistries:   Recent Labs   Lab 06/28/22  1319 06/28/22  1816 06/29/22  0606    140 139   K 7.3* 5.8* 4.5     105 103   CO2 20* 23 26   BUN 22* 24* 22*   CREATININE 3.9* 2.9* 1.4   CALCIUM 9.1 8.4* 8.7   PROT 9.0*  --  6.9   BILITOT 0.4  --  0.5   ALKPHOS 53*  --  41*   ALT 49*  --  65*   AST 78*  --  130*   MG  --   --  2.1   PHOS  --   --  3.6        Cardiac Enzymes: Ejection Fractions:    Recent Labs     06/28/22 2030 06/28/22  2344   TROPONINI 0.518* 0.481*    EF   Date Value Ref Range Status   06/29/2022 55 % Final        POCT Glucose: HbA1c:    Recent Labs   Lab 06/28/22  1510 06/28/22  1611   POCTGLUCOSE 147* 145*    No results found for: HGBA1C        ICU LOS 1d 1h  Level of Care: OK to Transfer    Chart Check: 12 HR Done  Shift Summary/Plan for the shift: Remains in ICU as tele boarder. IV infusions of simvastatin and fluids d/c'd.  EGD completed today, no signs of active bleed; full liquid diet placed, may advance tomorrow.  Voids via urinal.  POC reviewed with pt; expressed understanding.

## 2022-06-29 NOTE — ASSESSMENT & PLAN NOTE
- Denies chest pain, troponin elevated at 0.544  - ECG reviewed, no elizabeth ST elevation  - Cardiology consulted  - suspect elevated in setting of hypoxia/acute renal failure/demand  Discussed with Dr. Juarez.  [facility] tomorrow if cleared for ASA and Plavix by GI post EGD.

## 2022-06-29 NOTE — PLAN OF CARE
Remains in ICU as tele boarder. IV infusions of simvastatin and fluids d/c'd.  EGD completed today, no signs of active bleed; full liquid diet placed, may advance tomorrow.  Voids via urinal.  POC reviewed with pt; expressed understanding.     Problem: Adult Inpatient Plan of Care  Goal: Plan of Care Review  Outcome: Ongoing, Progressing  Goal: Patient-Specific Goal (Individualized)  Outcome: Ongoing, Progressing  Goal: Absence of Hospital-Acquired Illness or Injury  Outcome: Ongoing, Progressing  Goal: Optimal Comfort and Wellbeing  Outcome: Ongoing, Progressing  Goal: Readiness for Transition of Care  Outcome: Ongoing, Progressing     Problem: Fluid and Electrolyte Imbalance (Acute Kidney Injury/Impairment)  Goal: Fluid and Electrolyte Balance  Outcome: Ongoing, Progressing     Problem: Oral Intake Inadequate (Acute Kidney Injury/Impairment)  Goal: Optimal Nutrition Intake  Outcome: Ongoing, Progressing     Problem: Renal Function Impairment (Acute Kidney Injury/Impairment)  Goal: Effective Renal Function  Outcome: Ongoing, Progressing

## 2022-06-29 NOTE — ANESTHESIA PREPROCEDURE EVALUATION
2022    Pre-operative evaluation for Procedure(s) (LRB):  EGD (ESOPHAGOGASTRODUODENOSCOPY) (N/A)    Feliberto Crowley is a 41 y.o. male     Patient Active Problem List   Diagnosis    Closed C7 fracture    Acute renal failure    Hyperkalemia    Tobacco use    Elevated troponin    History of hematemesis    Cocaine abuse       Review of patient's allergies indicates:  No Known Allergies    No current facility-administered medications on file prior to encounter.     No current outpatient medications on file prior to encounter.       History reviewed. No pertinent surgical history.    Social History     Socioeconomic History    Marital status:    Tobacco Use    Smoking status: Current Every Day Smoker    Smokeless tobacco: Never Used   Substance and Sexual Activity    Alcohol use: Yes    Drug use: Yes     Types: Cocaine         CBC:   Recent Labs     22  1319 22  0636   WBC 14.74* 11.73   RBC 5.54 4.51*   HGB 16.8 13.7*   HCT 51.1 41.3    156   MCV 92 92   MCH 30.3 30.4   MCHC 32.9 33.2       CMP:   Recent Labs     22  1319 22  1816 22  0606    140 139   K 7.3* 5.8* 4.5    105 103   CO2 20* 23 26   BUN 22* 24* 22*   CREATININE 3.9* 2.9* 1.4    132* 129*   MG  --   --  2.1   PHOS  --   --  3.6   CALCIUM 9.1 8.4* 8.7   ALBUMIN 4.4  --  3.5   PROT 9.0*  --  6.9   ALKPHOS 53*  --  41*   ALT 49*  --  65*   AST 78*  --  130*   BILITOT 0.4  --  0.5       INR  Recent Labs     22  1319   INR 1.0   APTT 26.1           Diagnostic Studies:      EKD Echo:  No results found for this or any previous visit.        Pre-op Assessment    I have reviewed the Patient Summary Reports.    I have reviewed the NPO Status.   I have reviewed the Medications.     Review of Systems  Anesthesia Hx:  No problems with previous Anesthesia  History of  prior surgery of interest to airway management or planning: Denies Family Hx of Anesthesia complications.   Denies Personal Hx of Anesthesia complications.   Social:  Non-Smoker  Alcohol Use:  Illicit Drug Use: Types of drugs include Cocaine,   Hematology/Oncology:  Hematology Normal   Oncology Normal     EENT/Dental:EENT/Dental Normal   Cardiovascular:   Exercise tolerance: good    Pulmonary:  Pulmonary Normal    Renal/:   Chronic Renal Disease, ARF    Hepatic/GI:  Hepatic/GI Normal    Musculoskeletal:  Musculoskeletal Normal    Neurological:  Neurology Normal    Endocrine:  Endocrine Normal    Dermatological:  Skin Normal    Psych:  Psychiatric Normal           Physical Exam  General: Well nourished, Cooperative, Alert and Oriented    Airway:  Mouth Opening: Normal  TM Distance: Normal  Tongue: Normal  Neck ROM: Normal ROM    Dental:  Intact    Chest/Lungs:  Normal Respiratory Rate    Heart:  Rate: Normal  Rhythm: Regular Rhythm    Abdomen:  Normal        Anesthesia Plan  Type of Anesthesia, risks & benefits discussed:    Anesthesia Type: Gen Natural Airway  Intra-op Monitoring Plan: Standard ASA Monitors  Induction:  IV  Airway Plan: Direct  Informed Consent: Informed consent signed with the Patient and all parties understand the risks and agree with anesthesia plan.  All questions answered. Patient consented to blood products? Yes  ASA Score: 2    Ready For Surgery From Anesthesia Perspective.     .

## 2022-06-29 NOTE — CLINICAL REVIEW
Follow up labs show improvement:     Latest Reference Range & Units 06/28/22 13:19 06/28/22 16:24 06/28/22 18:16 06/28/22 20:30   Sodium 136 - 145 mmol/L 138  140    Potassium 3.5 - 5.1 mmol/L 7.3 (HH)  5.8 (H)    Chloride 95 - 110 mmol/L 102  105    CO2 23 - 29 mmol/L 20 (L)  23    Anion Gap 8 - 16 mmol/L 16  12    BUN 6 - 20 mg/dL 22 (H)  24 (H)    Creatinine 0.5 - 1.4 mg/dL 3.9 (H)  2.9 (H)    eGFR if non African American >60 mL/min/1.73 m^2 18 !  26 !    eGFR if African American >60 mL/min/1.73 m^2 21 !  30 !    Glucose 70 - 110 mg/dL 110  132 (H)    Calcium 8.7 - 10.5 mg/dL 9.1  8.4 (L)    Alkaline Phosphatase 55 - 135 U/L 53 (L)      PROTEIN TOTAL 6.0 - 8.4 g/dL 9.0 (H)      Albumin 3.5 - 5.2 g/dL 4.4      BILIRUBIN TOTAL 0.1 - 1.0 mg/dL 0.4      AST 10 - 40 U/L 78 (H)      ALT 10 - 44 U/L 49 (H)      Lipase 4 - 60 U/L 33      BNP 0 - 99 pg/mL 76      Troponin I 0.000 - 0.026 ng/mL 0.544 (H) 0.611 (H)  0.518 (H)   Lactate, [place] 0.5 - 2.2 mmol/L 2.3 (H) 3.2 (H)

## 2022-06-29 NOTE — PROGRESS NOTES
[facility] Medicine  Progress Note    Patient Name: Feliberto Crowley  MRN: [id number]  Patient Class: IP- Inpatient   Admission Date: 6/28/2022  Length of Stay: 1 days  Attending Physician: Pepe Devries MD  Primary Care Provider: Primary Doctor No        Subjective:     Principal Problem:Acute renal failure        HPI:  Mr. Crowley is a 41 year old male with no reported past medical history who presents to the emergency department for evaluation vomiting dark brown substance.  Patient states he drink a few beers last night and went to sleep.  He said he woke feeling nauseous and vomited everywhere.  He was also feeling weak so he came to the emergency department for further evaluation.  He tells me he snorts cocaine occasionally but is unsure if he did this yesterday.  He denies any other drug use.  He does smoke cigarettes. He denies taking any blood thinners. He is unsure of what exactly happened. 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 Has not been out in the heat. Denies any renal issues. Patient denies chest pain, states he had some heart burn earlier. Maybe some abdominal pain/discomfort but none now. No fevers or chills.   In the ED, he was found to be hypoxic and vomited brown substance. He was placed on nasal cannula with improvement. He was also found to have Cr of 3.9, K of 7.3, troponin of 0.544 and Hb of 16. Alcohol level was <10. He was given insulin, bicarb, lokelma, and dextrose as well as albuterol treatment. He was given 2 liters NS. Cardiology was consulted and recommended heparin drip.      Overview/Hospital Course:  42 y/o male presented with hematemesis.  Patient noted to be in ARF with hyperkalemia on presentation.  Started on IVF hydration with improvement of renal function.  Elevated Troponin and Cardiology consulted.  Hyperkalemia resolved.  Started on IV PPI and Octreotide drip with GI consult.      Interval  History: Feeling better.    Review of Systems   HENT:  Negative for ear discharge and ear pain.    Eyes:  Negative for discharge and itching.   Endocrine: Negative for cold intolerance and heat intolerance.   Neurological:  Negative for seizures and syncope.   Objective:     Vital Signs (Most Recent):  Temp: 97.6 °F (36.4 °C) (06/29/22 0701)  Pulse: 82 (06/29/22 0701)  Resp: 13 (06/29/22 0701)  BP: 135/84 (06/29/22 0701)  SpO2: 99 % (06/29/22 0701) Vital Signs (24h Range):  Temp:  [97 °F (36.1 °C)-99.2 °F (37.3 °C)] 97.6 °F (36.4 °C)  Pulse:  [] 82  Resp:  [11-36] 13  SpO2:  [86 %-100 %] 99 %  BP: (108-148)/(72-93) 135/84     Weight: 102.2 kg (225 lb 5 oz)  Body mass index is 29.73 kg/m².    Intake/Output Summary (Last 24 hours) at 6/29/2022 0948  Last data filed at 6/29/2022 0701  Gross per 24 hour   Intake 2232.27 ml   Output 900 ml   Net 1332.27 ml      Physical Exam  Vitals and nursing note reviewed.   Constitutional:       General: He is not in acute distress.     Appearance: Normal appearance. He is not diaphoretic.   HENT:      Head: Normocephalic.      Mouth/Throat:      Pharynx: Oropharynx is clear.   Eyes:      General: No scleral icterus.  Cardiovascular:      Rate and Rhythm: Normal rate and regular rhythm.      Pulses: Normal pulses.      Heart sounds: Normal heart sounds. No murmur heard.  Pulmonary:      Effort: Pulmonary effort is normal.      Breath sounds: No wheezing.      Comments: Poor air movement  Abdominal:      General: Bowel sounds are normal. There is no distension.      Palpations: Abdomen is soft.      Tenderness: There is no abdominal tenderness.   Musculoskeletal:         General: No swelling. Normal range of motion.   Skin:     General: Skin is warm and dry.   Neurological:      Mental Status: He is alert and oriented to person, place, and time.      Motor: No weakness.   Psychiatric:         Attention and Perception: Attention normal.         Speech: Speech normal.          "Behavior: Behavior normal. Behavior is cooperative.         Thought Content: Thought content normal.       Significant Labs: All pertinent labs within the past 24 hours have been reviewed.  BMP:   Recent Labs   Lab 06/29/22  0606   *      K 4.5      CO2 26   BUN 22*   CREATININE 1.4   CALCIUM 8.7   MG 2.1     CBC:   Recent Labs   Lab 06/28/22  1319 06/29/22  0636   WBC 14.74* 11.73   HGB 16.8 13.7*   HCT 51.1 41.3    156       Significant Imaging: I have reviewed all pertinent imaging results/findings within the past 24 hours.      Assessment/Plan:      * Acute renal failure  - Patient presented with acute renal failure with Cr of 3.9 and hyperkalemia  - patient denies any hx of renal dysfunction, decreased urine output- he was given IVF and shifting medications  Probably pre renal ARF secondary to dehydration from vomiting as Creat greatly improved with IVF hydration.  Continue to avoid nephrotoxic medications.            History of hematemesis  Patient reported dark colored vomit with specks of blood. Nurse reported "Coffee ground"  Drop in H/H, but unsure if this may be related to hemodilution.  - on PPI IV  - GI consulted and started on octreotide  Plan for EGD today.      Cocaine abuse  Negative tox screen.      Elevated troponin  - Denies chest pain, troponin elevated at 0.544  - ECG reviewed, no elizabeth ST elevation  - Cardiology consulted  - suspect elevated in setting of hypoxia/acute renal failure/demand  Discussed with Dr. Juarez.  [facility] tomorrow if cleared for ASA and Plavix by GI post EGD.        Tobacco use  Spent more than 3 minutes on smoking cessation counseling.      Hyperkalemia  - presented with K of 7.3 in setting of acute renal failure  - shifted in ED + given lokelma  Resolved         VTE Risk Mitigation (From admission, onward)         Ordered     IP VTE LOW RISK PATIENT  Once         06/28/22 1511                Discharge Planning   VISHNU:      Code Status: Full Code   Is " the patient medically ready for discharge?:     Reason for patient still in hospital (select all that apply): Treatment             Pepe Devries MD  Department of Hospital Medicine   [facility] - Intensive Care

## 2022-06-29 NOTE — SUBJECTIVE & OBJECTIVE
Interval History:  No further episodes of chest pain.  GI workup today.    Review of Systems   Constitutional: Negative.   HENT: Negative.     Eyes: Negative.    Cardiovascular: Negative.    Respiratory: Negative.     Endocrine: Negative.    Hematologic/Lymphatic: Negative.    Skin: Negative.    Musculoskeletal: Negative.    Genitourinary: Negative.    Neurological: Negative.    Psychiatric/Behavioral: Negative.     Allergic/Immunologic: Negative.    Objective:     Vital Signs (Most Recent):  Temp: 97.8 °F (36.6 °C) (06/29/22 1501)  Pulse: 83 (06/29/22 1700)  Resp: 13 (06/29/22 1700)  BP: (!) 158/79 (06/29/22 1700)  SpO2: 95 % (06/29/22 1700)   Vital Signs (24h Range):  Temp:  [96.8 °F (36 °C)-98.2 °F (36.8 °C)] 97.8 °F (36.6 °C)  Pulse:  [] 83  Resp:  [11-39] 13  SpO2:  [90 %-100 %] 95 %  BP: (108-158)/(73-94) 158/79     Weight: 102.2 kg (225 lb 5 oz)  Body mass index is 29.73 kg/m².     SpO2: 95 %  O2 Device (Oxygen Therapy): nasal cannula      Intake/Output Summary (Last 24 hours) at 6/29/2022 1759  Last data filed at 6/29/2022 1600  Gross per 24 hour   Intake 381.54 ml   Output 2250 ml   Net -1868.46 ml       Lines/Drains/Airways       Peripheral Intravenous Line  Duration                  Peripheral IV - Single Lumen 06/28/22 1315 20 G Left Antecubital 1 day         Peripheral IV - Single Lumen 06/28/22 1600 20 G Right Antecubital 1 day                    Physical Exam  Vitals reviewed.   Constitutional:       Appearance: He is well-developed.   HENT:      Head: Normocephalic.   Eyes:      Conjunctiva/sclera: Conjunctivae normal.      Pupils: Pupils are equal, round, and reactive to light.   Cardiovascular:      Rate and Rhythm: Normal rate and regular rhythm.      Heart sounds: Normal heart sounds.   Pulmonary:      Effort: Pulmonary effort is normal.      Breath sounds: Normal breath sounds.   Abdominal:      General: Bowel sounds are normal.      Palpations: Abdomen is soft.   Musculoskeletal:       Cervical back: Normal range of motion and neck supple.   Skin:     General: Skin is warm.   Neurological:      Mental Status: He is alert and oriented to person, place, and time.       Significant Labs: BMP:   Recent Labs   Lab 06/28/22  1319 06/28/22  1816 06/29/22  0606    132* 129*    140 139   K 7.3* 5.8* 4.5    105 103   CO2 20* 23 26   BUN 22* 24* 22*   CREATININE 3.9* 2.9* 1.4   CALCIUM 9.1 8.4* 8.7   MG  --   --  2.1   , CMP   Recent Labs   Lab 06/28/22  1319 06/28/22  1816 06/29/22  0606    140 139   K 7.3* 5.8* 4.5    105 103   CO2 20* 23 26    132* 129*   BUN 22* 24* 22*   CREATININE 3.9* 2.9* 1.4   CALCIUM 9.1 8.4* 8.7   PROT 9.0*  --  6.9   ALBUMIN 4.4  --  3.5   BILITOT 0.4  --  0.5   ALKPHOS 53*  --  41*   AST 78*  --  130*   ALT 49*  --  65*   ANIONGAP 16 12 10   ESTGFRAFRICA 21* 30* >60   EGFRNONAA 18* 26* >60   , CBC   Recent Labs   Lab 06/28/22  1319 06/29/22  0636   WBC 14.74* 11.73   HGB 16.8 13.7*   HCT 51.1 41.3    156   , INR   Recent Labs   Lab 06/28/22  1319   INR 1.0   , Lipid Panel No results for input(s): CHOL, HDL, LDLCALC, TRIG, CHOLHDL in the last 48 hours., Troponin   Recent Labs   Lab 06/28/22  1624 06/28/22  2030 06/28/22  2344   TROPONINI 0.611* 0.518* 0.481*   , and All pertinent lab results from the last 24 hours have been reviewed.    Significant Imaging: Echocardiogram: Transthoracic echo (TTE) complete (Cupid Only):   Results for orders placed or performed during the hospital encounter of 06/28/22   Echo   Result Value Ref Range    BSA 2.29 m2    TDI SEPTAL 0.08 m/s    LV LATERAL E/E' RATIO 8.09 m/s    LV SEPTAL E/E' RATIO 11.13 m/s    LA WIDTH 4.37 cm    TDI LATERAL 0.11 m/s    PV PEAK VELOCITY 0.93 cm/s    LVIDd 4.96 3.5 - 6.0 cm    IVS 1.00 0.6 - 1.1 cm    Posterior Wall 1.03 0.6 - 1.1 cm    LVIDs 3.57 2.1 - 4.0 cm    FS 28 28 - 44 %    LA volume 51.41 cm3    Sinus 2.82 cm    STJ 2.55 cm    Ascending aorta 2.75 cm    LV mass  183.23 g    LA size 2.93 cm    RVDD 3.83 cm    TAPSE 1.84 cm    RV S' 12.01 cm/s    Left Ventricle Relative Wall Thickness 0.42 cm    AV mean gradient 4 mmHg    AV valve area 2.75 cm2    AV Velocity Ratio 0.81     AV index (prosthetic) 0.83     MV valve area p 1/2 method 6.91 cm2    E/A ratio 1.59     Mean e' 0.10 m/s    E wave deceleration time 109.77 msec    IVRT 122.26 msec    Pulm vein S/D ratio 1.00     LVOT diameter 2.06 cm    LVOT area 3.3 cm2    LVOT peak broderick 1.09 m/s    LVOT peak VTI 23.40 cm    Ao peak broderick 1.35 m/s    Ao VTI 28.35 cm    LVOT stroke volume 77.95 cm3    AV peak gradient 7 mmHg    E/E' ratio 9.37 m/s    MV Peak E Broderick 0.89 m/s    TR Max Broderick 2.13 m/s    MV stenosis pressure 1/2 time 31.83 ms    MV Peak A Broderick 0.56 m/s    PV Peak S Broderick 0.43 m/s    PV Peak D Broderick 0.43 m/s    LV Systolic Volume 53.24 mL    LV Systolic Volume Index 23.6 mL/m2    LV Diastolic Volume 115.94 mL    LV Diastolic Volume Index 51.30 mL/m2    LA Volume Index 22.7 mL/m2    LV Mass Index 81 g/m2    RA Major Axis 4.65 cm    Left Atrium Minor Axis 4.66 cm    Left Atrium Major Axis 4.79 cm    Triscuspid Valve Regurgitation Peak Gradient 18 mmHg    RA Width 3.25 cm    Right Atrial Pressure (from IVC) 3 mmHg    EF 55 %    TV rest pulmonary artery pressure 21 mmHg    Narrative    · The estimated ejection fraction is 55%.  · Normal systolic function.  · Normal left ventricular diastolic function.  · Normal right ventricular size with normal right ventricular systolic   function.  · Mild left atrial enlargement.  · Normal central venous pressure (3 mmHg).  · The estimated PA systolic pressure is 21 mmHg.

## 2022-06-29 NOTE — NURSING
Patient transported back to ICU via bed accompanied by Anesthesia and Endo nurse. Patient  Easily awaken after sedation. Report given to ICU nurse ADILENE Pope

## 2022-06-29 NOTE — ASSESSMENT & PLAN NOTE
- presented with K of 7.3 in setting of acute renal failure  - shifted in ED + given lokelma  Resolved

## 2022-06-29 NOTE — ASSESSMENT & PLAN NOTE
- Patient presented with acute renal failure with Cr of 3.9 and hyperkalemia  - patient denies any hx of renal dysfunction, decreased urine output- he was given IVF and shifting medications  Probably pre renal ARF secondary to dehydration from vomiting as Creat greatly improved with IVF hydration.  Continue to avoid nephrotoxic medications.

## 2022-06-30 VITALS
SYSTOLIC BLOOD PRESSURE: 155 MMHG | HEART RATE: 84 BPM | DIASTOLIC BLOOD PRESSURE: 90 MMHG | RESPIRATION RATE: 18 BRPM | OXYGEN SATURATION: 100 % | TEMPERATURE: 98 F | WEIGHT: 225.31 LBS | HEIGHT: 73 IN | BODY MASS INDEX: 29.86 KG/M2

## 2022-06-30 PROBLEM — I25.10 CORONARY ARTERY DISEASE INVOLVING NATIVE CORONARY ARTERY OF NATIVE HEART WITHOUT ANGINA PECTORIS: Status: ACTIVE | Noted: 2022-06-30

## 2022-06-30 LAB
ALBUMIN SERPL BCP-MCNC: 3.3 G/DL (ref 3.5–5.2)
ALP SERPL-CCNC: 40 U/L (ref 55–135)
ALT SERPL W/O P-5'-P-CCNC: 59 U/L (ref 10–44)
ANION GAP SERPL CALC-SCNC: 11 MMOL/L (ref 8–16)
AST SERPL-CCNC: 81 U/L (ref 10–40)
BACTERIA UR CULT: NO GROWTH
BASOPHILS # BLD AUTO: 0.02 K/UL (ref 0–0.2)
BASOPHILS NFR BLD: 0.3 % (ref 0–1.9)
BILIRUB SERPL-MCNC: 0.7 MG/DL (ref 0.1–1)
BUN SERPL-MCNC: 12 MG/DL (ref 6–20)
CALCIUM SERPL-MCNC: 8.7 MG/DL (ref 8.7–10.5)
CHLORIDE SERPL-SCNC: 104 MMOL/L (ref 95–110)
CHOLEST SERPL-MCNC: 192 MG/DL (ref 120–199)
CHOLEST/HDLC SERPL: 5.5 {RATIO} (ref 2–5)
CO2 SERPL-SCNC: 24 MMOL/L (ref 23–29)
CREAT SERPL-MCNC: 0.9 MG/DL (ref 0.5–1.4)
DIFFERENTIAL METHOD: ABNORMAL
EOSINOPHIL # BLD AUTO: 0 K/UL (ref 0–0.5)
EOSINOPHIL NFR BLD: 0.3 % (ref 0–8)
ERYTHROCYTE [DISTWIDTH] IN BLOOD BY AUTOMATED COUNT: 15 % (ref 11.5–14.5)
EST. GFR  (AFRICAN AMERICAN): >60 ML/MIN/1.73 M^2
EST. GFR  (NON AFRICAN AMERICAN): >60 ML/MIN/1.73 M^2
GLUCOSE SERPL-MCNC: 82 MG/DL (ref 70–110)
HCT VFR BLD AUTO: 38.9 % (ref 40–54)
HDLC SERPL-MCNC: 35 MG/DL (ref 40–75)
HDLC SERPL: 18.2 % (ref 20–50)
HGB BLD-MCNC: 12.8 G/DL (ref 14–18)
IMM GRANULOCYTES # BLD AUTO: 0.01 K/UL (ref 0–0.04)
IMM GRANULOCYTES NFR BLD AUTO: 0.2 % (ref 0–0.5)
LDLC SERPL CALC-MCNC: 107 MG/DL (ref 63–159)
LYMPHOCYTES # BLD AUTO: 2 K/UL (ref 1–4.8)
LYMPHOCYTES NFR BLD: 34.8 % (ref 18–48)
MAGNESIUM SERPL-MCNC: 2 MG/DL (ref 1.6–2.6)
MCH RBC QN AUTO: 30.7 PG (ref 27–31)
MCHC RBC AUTO-ENTMCNC: 32.9 G/DL (ref 32–36)
MCV RBC AUTO: 93 FL (ref 82–98)
MONOCYTES # BLD AUTO: 0.5 K/UL (ref 0.3–1)
MONOCYTES NFR BLD: 8.5 % (ref 4–15)
NEUTROPHILS # BLD AUTO: 3.3 K/UL (ref 1.8–7.7)
NEUTROPHILS NFR BLD: 55.9 % (ref 38–73)
NONHDLC SERPL-MCNC: 157 MG/DL
NRBC BLD-RTO: 0 /100 WBC
PHOSPHATE SERPL-MCNC: 3.3 MG/DL (ref 2.7–4.5)
PLATELET # BLD AUTO: ABNORMAL K/UL (ref 150–450)
PLATELET BLD QL SMEAR: ABNORMAL
PMV BLD AUTO: ABNORMAL FL (ref 9.2–12.9)
POTASSIUM SERPL-SCNC: 4.5 MMOL/L (ref 3.5–5.1)
PROT SERPL-MCNC: 6.7 G/DL (ref 6–8.4)
RBC # BLD AUTO: 4.17 M/UL (ref 4.6–6.2)
SODIUM SERPL-SCNC: 139 MMOL/L (ref 136–145)
TRIGL SERPL-MCNC: 250 MG/DL (ref 30–150)
WBC # BLD AUTO: 5.87 K/UL (ref 3.9–12.7)

## 2022-06-30 PROCEDURE — C1887 CATHETER, GUIDING: HCPCS | Performed by: INTERNAL MEDICINE

## 2022-06-30 PROCEDURE — C1894 INTRO/SHEATH, NON-LASER: HCPCS | Performed by: INTERNAL MEDICINE

## 2022-06-30 PROCEDURE — 99152 PR MOD CONSCIOUS SEDATION, SAME PHYS, 5+ YRS, FIRST 15 MIN: ICD-10-PCS | Mod: ,,, | Performed by: INTERNAL MEDICINE

## 2022-06-30 PROCEDURE — 93454 PR CATH PLACE/CORONARY ANGIO, IMG SUPER/INTERP: ICD-10-PCS | Mod: 26,,, | Performed by: INTERNAL MEDICINE

## 2022-06-30 PROCEDURE — 36415 COLL VENOUS BLD VENIPUNCTURE: CPT | Performed by: STUDENT IN AN ORGANIZED HEALTH CARE EDUCATION/TRAINING PROGRAM

## 2022-06-30 PROCEDURE — 36415 COLL VENOUS BLD VENIPUNCTURE: CPT | Performed by: HOSPITALIST

## 2022-06-30 PROCEDURE — 63600175 PHARM REV CODE 636 W HCPCS: Performed by: INTERNAL MEDICINE

## 2022-06-30 PROCEDURE — 80053 COMPREHEN METABOLIC PANEL: CPT | Performed by: STUDENT IN AN ORGANIZED HEALTH CARE EDUCATION/TRAINING PROGRAM

## 2022-06-30 PROCEDURE — C9113 INJ PANTOPRAZOLE SODIUM, VIA: HCPCS | Performed by: STUDENT IN AN ORGANIZED HEALTH CARE EDUCATION/TRAINING PROGRAM

## 2022-06-30 PROCEDURE — 85025 COMPLETE CBC W/AUTO DIFF WBC: CPT | Performed by: HOSPITALIST

## 2022-06-30 PROCEDURE — 93454 CORONARY ARTERY ANGIO S&I: CPT | Mod: 26,,, | Performed by: INTERNAL MEDICINE

## 2022-06-30 PROCEDURE — 99152 MOD SED SAME PHYS/QHP 5/>YRS: CPT | Performed by: INTERNAL MEDICINE

## 2022-06-30 PROCEDURE — 25500020 PHARM REV CODE 255: Performed by: INTERNAL MEDICINE

## 2022-06-30 PROCEDURE — 25000003 PHARM REV CODE 250: Performed by: INTERNAL MEDICINE

## 2022-06-30 PROCEDURE — 99152 MOD SED SAME PHYS/QHP 5/>YRS: CPT | Mod: ,,, | Performed by: INTERNAL MEDICINE

## 2022-06-30 PROCEDURE — 84100 ASSAY OF PHOSPHORUS: CPT | Performed by: STUDENT IN AN ORGANIZED HEALTH CARE EDUCATION/TRAINING PROGRAM

## 2022-06-30 PROCEDURE — 99153 MOD SED SAME PHYS/QHP EA: CPT | Performed by: INTERNAL MEDICINE

## 2022-06-30 PROCEDURE — C1769 GUIDE WIRE: HCPCS | Performed by: INTERNAL MEDICINE

## 2022-06-30 PROCEDURE — 93454 CORONARY ARTERY ANGIO S&I: CPT | Performed by: INTERNAL MEDICINE

## 2022-06-30 PROCEDURE — 83735 ASSAY OF MAGNESIUM: CPT | Performed by: STUDENT IN AN ORGANIZED HEALTH CARE EDUCATION/TRAINING PROGRAM

## 2022-06-30 PROCEDURE — 63600175 PHARM REV CODE 636 W HCPCS: Performed by: STUDENT IN AN ORGANIZED HEALTH CARE EDUCATION/TRAINING PROGRAM

## 2022-06-30 PROCEDURE — 80061 LIPID PANEL: CPT | Performed by: HOSPITALIST

## 2022-06-30 RX ORDER — FENTANYL CITRATE 50 UG/ML
INJECTION, SOLUTION INTRAMUSCULAR; INTRAVENOUS
Status: DISCONTINUED | OUTPATIENT
Start: 2022-06-30 | End: 2022-06-30 | Stop reason: HOSPADM

## 2022-06-30 RX ORDER — HEPARIN SOD,PORCINE/0.9 % NACL 1000/500ML
INTRAVENOUS SOLUTION INTRAVENOUS
Status: DISCONTINUED | OUTPATIENT
Start: 2022-06-30 | End: 2022-06-30 | Stop reason: HOSPADM

## 2022-06-30 RX ORDER — OXYCODONE HYDROCHLORIDE 5 MG/1
5 TABLET ORAL EVERY 4 HOURS PRN
Status: DISCONTINUED | OUTPATIENT
Start: 2022-06-30 | End: 2022-06-30 | Stop reason: HOSPADM

## 2022-06-30 RX ORDER — LIDOCAINE HYDROCHLORIDE 10 MG/ML
INJECTION, SOLUTION EPIDURAL; INFILTRATION; INTRACAUDAL; PERINEURAL
Status: DISCONTINUED | OUTPATIENT
Start: 2022-06-30 | End: 2022-06-30 | Stop reason: HOSPADM

## 2022-06-30 RX ORDER — ONDANSETRON 2 MG/ML
4 INJECTION INTRAMUSCULAR; INTRAVENOUS EVERY 12 HOURS PRN
Status: DISCONTINUED | OUTPATIENT
Start: 2022-06-30 | End: 2022-06-30 | Stop reason: HOSPADM

## 2022-06-30 RX ORDER — MORPHINE SULFATE 4 MG/ML
3 INJECTION, SOLUTION INTRAMUSCULAR; INTRAVENOUS
Status: DISCONTINUED | OUTPATIENT
Start: 2022-06-30 | End: 2022-06-30

## 2022-06-30 RX ORDER — SODIUM CHLORIDE 9 MG/ML
INJECTION, SOLUTION INTRAVENOUS CONTINUOUS
Status: DISCONTINUED | OUTPATIENT
Start: 2022-06-30 | End: 2022-06-30 | Stop reason: HOSPADM

## 2022-06-30 RX ORDER — ASPIRIN 325 MG
325 TABLET ORAL DAILY
Status: DISCONTINUED | OUTPATIENT
Start: 2022-06-30 | End: 2022-06-30

## 2022-06-30 RX ORDER — DIPHENHYDRAMINE HCL 25 MG
50 CAPSULE ORAL ONCE
Status: CANCELLED | OUTPATIENT
Start: 2022-06-30 | End: 2022-06-30

## 2022-06-30 RX ORDER — PANTOPRAZOLE SODIUM 40 MG/1
40 TABLET, DELAYED RELEASE ORAL 2 TIMES DAILY
Qty: 60 TABLET | Refills: 1 | Status: SHIPPED | OUTPATIENT
Start: 2022-06-30 | End: 2023-09-05

## 2022-06-30 RX ORDER — SODIUM CHLORIDE 9 MG/ML
INJECTION, SOLUTION INTRAVENOUS CONTINUOUS
Status: CANCELLED | OUTPATIENT
Start: 2022-06-30

## 2022-06-30 RX ORDER — PANTOPRAZOLE SODIUM 40 MG/10ML
80 INJECTION, POWDER, LYOPHILIZED, FOR SOLUTION INTRAVENOUS 2 TIMES DAILY
Status: DISCONTINUED | OUTPATIENT
Start: 2022-06-30 | End: 2022-06-30 | Stop reason: HOSPADM

## 2022-06-30 RX ORDER — ATORVASTATIN CALCIUM 40 MG/1
40 TABLET, FILM COATED ORAL DAILY
Qty: 90 TABLET | Refills: 3 | Status: SHIPPED | OUTPATIENT
Start: 2022-06-30 | End: 2023-06-30

## 2022-06-30 RX ORDER — ACETAMINOPHEN 325 MG/1
650 TABLET ORAL EVERY 4 HOURS PRN
Status: DISCONTINUED | OUTPATIENT
Start: 2022-06-30 | End: 2022-06-30

## 2022-06-30 RX ORDER — MIDAZOLAM HYDROCHLORIDE 1 MG/ML
INJECTION, SOLUTION INTRAMUSCULAR; INTRAVENOUS
Status: DISCONTINUED | OUTPATIENT
Start: 2022-06-30 | End: 2022-06-30 | Stop reason: HOSPADM

## 2022-06-30 RX ORDER — CLOPIDOGREL 300 MG/1
600 TABLET, FILM COATED ORAL ONCE
Status: COMPLETED | OUTPATIENT
Start: 2022-06-30 | End: 2022-06-30

## 2022-06-30 RX ADMIN — PANTOPRAZOLE SODIUM 40 MG: 40 INJECTION, POWDER, FOR SOLUTION INTRAVENOUS at 08:06

## 2022-06-30 RX ADMIN — CLOPIDOGREL BISULFATE 600 MG: 300 TABLET, FILM COATED ORAL at 10:06

## 2022-06-30 RX ADMIN — SODIUM CHLORIDE: 0.9 INJECTION, SOLUTION INTRAVENOUS at 02:06

## 2022-06-30 RX ADMIN — ASPIRIN 325 MG ORAL TABLET 325 MG: 325 PILL ORAL at 10:06

## 2022-06-30 NOTE — DISCHARGE SUMMARY
[facility] Medicine  Discharge Summary      Patient Name: Feliberto Crowley  MRN: [id number]  Patient Class: IP- Inpatient  Admission Date: 6/28/2022  Hospital Length of Stay: 2 days  Discharge Date and Time:  06/30/2022 4:16 PM  Attending Physician: Lashaun Morocho MD   Discharging Provider: Lashaun Morocho MD  Primary Care Provider: Primary Doctor No      HPI:   Mr. Crowley is a 41 year old male with no reported past medical history who presents to the emergency department for evaluation vomiting dark brown substance.  Patient states he drink a few beers last night and went to sleep.  He said he woke feeling nauseous and vomited everywhere.  He was also feeling weak so he came to the emergency department for further evaluation.  He tells me he snorts cocaine occasionally but is unsure if he did this yesterday.  He denies any other drug use.  He does smoke cigarettes. He denies taking any blood thinners. He is unsure of what exactly happened. 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 Has not been out in the heat. Denies any renal issues. Patient denies chest pain, states he had some heart burn earlier. Maybe some abdominal pain/discomfort but none now. No fevers or chills.   In the ED, he was found to be hypoxic and vomited brown substance. He was placed on nasal cannula with improvement. He was also found to have Cr of 3.9, K of 7.3, troponin of 0.544 and Hb of 16. Alcohol level was <10. He was given insulin, bicarb, lokelma, and dextrose as well as albuterol treatment. He was given 2 liters NS. Cardiology was consulted and recommended heparin drip.      Procedure(s) (LRB):  Left heart cath (Left)      Hospital Course:   Mr Feliberto Crowley was admitted with hematemesis, acute renal failure with hyperkalemia, and elevated troponin. Started PPI, octreotide, IVF. EGD 6/29 showed esophagitis. GI recommending PPI BID. MI resolved with IVF. Cardiology  consulted for elevated troponin. [facility] 6/30 showed non obstructive CAD- 30% mid RCA stenosis not requiring stent. Discussed with Cardiology- due to bleeding, hold on asa/plavix but start statin upon discharge. He is stable for discharge to home with PCP follow up.        Goals of Care Treatment Preferences:  Code Status: Full Code      Consults:   Consults (From admission, onward)        Status Ordering Provider     Inpatient consult to Gastroenterology  Once        Provider:  (Not yet assigned)    Completed NISHANT DALY     Inpatient consult to Cardiology  Once        Provider:  (Not yet assigned)    Completed NISHANT DALY          No new Assessment & Plan notes have been filed under this hospital service since the last note was generated.  Service: Hospital Medicine    Final Active Diagnoses:    Diagnosis Date Noted POA    PRINCIPAL PROBLEM:  Acute renal failure [N17.9] 06/28/2022 Yes    Coronary artery disease involving native coronary artery of native heart without angina pectoris [I25.10] 06/30/2022 Yes    Esophagitis, Gillett grade C [K20.80] 06/29/2022 Yes    Acute blood loss anemia [D62] 06/29/2022 Yes    Hyperkalemia [E87.5] 06/28/2022 Yes    Tobacco use [Z72.0] 06/28/2022 Yes    Elevated troponin [R77.8] 06/28/2022 Yes    GI hemorrhage [K92.2] 06/28/2022 Yes    Cocaine abuse [F14.10] 06/28/2022 Yes      Problems Resolved During this Admission:    Diagnosis Date Noted Date Resolved POA    Hypoxia [R09.02] 06/28/2022 06/29/2022 Yes       Discharged Condition: good    Disposition: Home or Self Care    Follow Up:   Follow-up Information     [facility] Call.    Contact information:  [address] [place] [address] [phone]                       Patient Instructions:      Diet Cardiac     Notify your health care provider if you experience any of the following:  temperature >100.4     Notify your health care provider if you experience any of the following:   persistent nausea and vomiting or diarrhea     Notify your health care provider if you experience any of the following:  severe uncontrolled pain     Notify your health care provider if you experience any of the following:  redness, tenderness, or signs of infection (pain, swelling, redness, odor or green/yellow discharge around incision site)     Notify your health care provider if you experience any of the following:  difficulty breathing or increased cough     Notify your health care provider if you experience any of the following:  severe persistent headache     Notify your health care provider if you experience any of the following:  worsening rash     Notify your health care provider if you experience any of the following:  persistent dizziness, light-headedness, or visual disturbances     Notify your health care provider if you experience any of the following:  increased confusion or weakness     Activity as tolerated       Significant Diagnostic Studies: Labs: All labs within the past 24 hours have been reviewed    Pending Diagnostic Studies:     None         Medications:  Reconciled Home Medications:      Medication List      START taking these medications    atorvastatin 40 MG tablet  Commonly known as: LIPITOR  Take 1 tablet (40 mg total) by mouth once daily.     pantoprazole 40 MG tablet  Commonly known as: PROTONIX  Take 1 tablet (40 mg total) by mouth 2 (two) times daily.            Indwelling Lines/Drains at time of discharge:   Lines/Drains/Airways     None                 Time spent on the discharge of patient: 35 minutes         Lashaun Morocho MD  Department of Hospital Medicine  [facility]

## 2022-06-30 NOTE — PLAN OF CARE
[facility] - Telemetry  Discharge Final Note    Primary Care Provider: Primary Doctor No    Expected Discharge Date: 6/30/2022    Final Discharge Note (most recent)       Final Note - 06/30/22 1620          Final Note    Assessment Type Final Discharge Note     Anticipated Discharge Disposition Home or Self Care     What phone number can be called within the next 1-3 days to see how you are doing after discharge? [phone]     Hospital Resources/Appts/Education Provided Provided patient/caregiver with written discharge plan information;Appointments scheduled and added to AVS        Post-Acute Status    Post-Acute Authorization Other     Other Status No Post-Acute Service Needs     Discharge Delays None known at this time                     Important Message from Medicare             Contact Info       [facility]    [address]  [place] [address]   Phone: [phone]       Next Steps: Call           secure chat patient nurse Duyen that patient is cleared from case management standpoint.

## 2022-06-30 NOTE — NURSING
Discharge papers reviewed and given to patient.  Patient verbalizes understanding.  All questions answered.     Telemetry box removed and returned to monitor tech.  All IV lines removed with no complications.  Bleeding controlled.    All belongings gathered by patient/family.    Prescriptions delivered to patient bedside via [facility] pharmacy.

## 2022-06-30 NOTE — CARE UPDATE
Patient cleared for DAPT ( aspirin and plavix) by primary team and GI. Loaded with aspirin, plavix with plans for cath today    Risks, benefits and alternatives of the catheterization procedure were discussed with the patient.The risks of coronary angiography include but are not limited to: bleeding, infection, death, heart attack, arrhythmia, kidney injury or failure, potential need for dialysis, allergic reactions, stroke, need for emergency surgery, hematoma, pseudoaneurysm etc.  Should stenting be indicated, the patient has agreed to dual anti-platelet therapy for 1-consecutive year with a drug-eluting stent and a minimum of 1-month with the use of a bare metal stent. Additionally, pt is aware that non-compliance is likely to result in stent clotting with heart attack, heart failure, and/or death  The risks of moderate sedation include hypotension, respiratory depression, arrhythmias, bronchospasm, and death. Informed consent was obtained and the  patient is agreeable to proceed with the procedure. Consent was placed on the chart.

## 2022-06-30 NOTE — NURSING
Patient transferring to room 333. Attempts made to call report, was told Charge Nurse will call ICU back for report.

## 2022-06-30 NOTE — NURSING
Prescriptions delivered to pts bs per pharmacy tech. Pt unable to pay copay & says he'll return on Fri 7/1/22 to get meds. Dsg to right groin cdi c gauze 7 tegaderm dsg in place.

## (undated) DEVICE — HEMOSTAT VASC BAND REG 24CM

## (undated) DEVICE — INTRODUCER SHEATH 5FR W/.035

## (undated) DEVICE — KIT HAND CONTROL HIGH PRESSUR

## (undated) DEVICE — KIT SYR REUSABLE

## (undated) DEVICE — KIT INTRODUCER MICROPUNCTR 4F

## (undated) DEVICE — CATH DXTERITY JR40 100CM 5FR

## (undated) DEVICE — PAD RADI FEMORAL

## (undated) DEVICE — KIT GLIDESHEATH SLEND 6FR 10CM

## (undated) DEVICE — PAD DEFIB CADENCE ADULT R2

## (undated) DEVICE — CATH DXTERITY JL35 100CM 5FR

## (undated) DEVICE — CATH DXTERITY JL40 100CM 5FR

## (undated) DEVICE — KIT MANIFOLD LOW PRESS TUBING

## (undated) DEVICE — PACK CATH LAB

## (undated) DEVICE — WIRE GUIDE SAFE-T-J .035 260CM

## (undated) DEVICE — OMNIPAQUE 350MG 150ML VIAL